# Patient Record
Sex: FEMALE | NOT HISPANIC OR LATINO | Employment: UNEMPLOYED | URBAN - NONMETROPOLITAN AREA
[De-identification: names, ages, dates, MRNs, and addresses within clinical notes are randomized per-mention and may not be internally consistent; named-entity substitution may affect disease eponyms.]

---
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
## 2019-06-09 ENCOUNTER — HOSPITAL ENCOUNTER (EMERGENCY)
Facility: HOSPITAL | Age: 19
Discharge: HOME | End: 2019-06-10
Attending: FAMILY MEDICINE | Admitting: FAMILY MEDICINE

## 2019-06-09 VITALS
DIASTOLIC BLOOD PRESSURE: 62 MMHG | TEMPERATURE: 98 F | HEIGHT: 61 IN | WEIGHT: 248 LBS | BODY MASS INDEX: 46.82 KG/M2 | HEART RATE: 78 BPM | RESPIRATION RATE: 20 BRPM | SYSTOLIC BLOOD PRESSURE: 116 MMHG | OXYGEN SATURATION: 100 %

## 2019-06-09 DIAGNOSIS — R10.30 LOWER ABDOMINAL PAIN: Primary | ICD-10-CM

## 2019-06-09 DIAGNOSIS — Z3A.01 LESS THAN 8 WEEKS GESTATION OF PREGNANCY: ICD-10-CM

## 2019-06-09 LAB
ALBUMIN SERPL-MCNC: 3.6 G/DL (ref 3.5–5.2)
ALBUMIN/GLOB SERPL: 1.1 G/DL
ALP SERPL-CCNC: 52 U/L (ref 39–117)
ALT SERPL W P-5'-P-CCNC: 14 U/L (ref 1–33)
ANION GAP SERPL CALCULATED.3IONS-SCNC: 13 MMOL/L
AST SERPL-CCNC: 13 U/L (ref 1–32)
BACTERIA UR QL AUTO: ABNORMAL /HPF
BASOPHILS # BLD AUTO: 0.02 10*3/MM3 (ref 0–0.2)
BASOPHILS NFR BLD AUTO: 0.3 % (ref 0–1.5)
BILIRUB SERPL-MCNC: 0.4 MG/DL (ref 0.2–1.2)
BILIRUB UR QL STRIP: NEGATIVE
BUN BLD-MCNC: 8 MG/DL (ref 6–20)
BUN/CREAT SERPL: 14.3 (ref 7–25)
CALCIUM SPEC-SCNC: 8.9 MG/DL (ref 8.6–10.5)
CHLORIDE SERPL-SCNC: 101 MMOL/L (ref 98–107)
CLARITY UR: ABNORMAL
CO2 SERPL-SCNC: 21 MMOL/L (ref 22–29)
COLOR UR: YELLOW
CREAT BLD-MCNC: 0.56 MG/DL (ref 0.57–1)
DEPRECATED RDW RBC AUTO: 41.1 FL (ref 37–54)
EOSINOPHIL # BLD AUTO: 0.2 10*3/MM3 (ref 0–0.4)
EOSINOPHIL NFR BLD AUTO: 2.8 % (ref 0.3–6.2)
ERYTHROCYTE [DISTWIDTH] IN BLOOD BY AUTOMATED COUNT: 12.6 % (ref 12.3–15.4)
GFR SERPL CREATININE-BSD FRML MDRD: 137 ML/MIN/1.73
GFR SERPL CREATININE-BSD FRML MDRD: >150 ML/MIN/1.73
GLOBULIN UR ELPH-MCNC: 3.2 GM/DL
GLUCOSE BLD-MCNC: 85 MG/DL (ref 65–99)
GLUCOSE UR STRIP-MCNC: NEGATIVE MG/DL
HCG SERPL QL: POSITIVE
HCT VFR BLD AUTO: 36.8 % (ref 34–46.6)
HGB BLD-MCNC: 12.9 G/DL (ref 12–15.9)
HGB UR QL STRIP.AUTO: NEGATIVE
HYALINE CASTS UR QL AUTO: ABNORMAL /LPF
IMM GRANULOCYTES # BLD AUTO: 0.03 10*3/MM3 (ref 0–0.05)
IMM GRANULOCYTES NFR BLD AUTO: 0.4 % (ref 0–0.5)
KETONES UR QL STRIP: NEGATIVE
LEUKOCYTE ESTERASE UR QL STRIP.AUTO: ABNORMAL
LYMPHOCYTES # BLD AUTO: 3.16 10*3/MM3 (ref 0.7–3.1)
LYMPHOCYTES NFR BLD AUTO: 44.6 % (ref 19.6–45.3)
MCH RBC QN AUTO: 31.3 PG (ref 26.6–33)
MCHC RBC AUTO-ENTMCNC: 35.1 G/DL (ref 31.5–35.7)
MCV RBC AUTO: 89.3 FL (ref 79–97)
MONOCYTES # BLD AUTO: 0.79 10*3/MM3 (ref 0.1–0.9)
MONOCYTES NFR BLD AUTO: 11.2 % (ref 5–12)
NEUTROPHILS # BLD AUTO: 2.88 10*3/MM3 (ref 1.7–7)
NEUTROPHILS NFR BLD AUTO: 40.7 % (ref 42.7–76)
NITRITE UR QL STRIP: NEGATIVE
NRBC BLD AUTO-RTO: 0 /100 WBC (ref 0–0.2)
PH UR STRIP.AUTO: 6 [PH] (ref 5–9)
PLATELET # BLD AUTO: 234 10*3/MM3 (ref 140–450)
PMV BLD AUTO: 10.6 FL (ref 6–12)
POTASSIUM BLD-SCNC: 3.6 MMOL/L (ref 3.5–5.2)
PROT SERPL-MCNC: 6.8 G/DL (ref 6–8.5)
PROT UR QL STRIP: NEGATIVE
RBC # BLD AUTO: 4.12 10*6/MM3 (ref 3.77–5.28)
RBC # UR: ABNORMAL /HPF
REF LAB TEST METHOD: ABNORMAL
SODIUM BLD-SCNC: 135 MMOL/L (ref 136–145)
SP GR UR STRIP: 1.03 (ref 1–1.03)
SQUAMOUS #/AREA URNS HPF: ABNORMAL /HPF
UROBILINOGEN UR QL STRIP: ABNORMAL
WBC NRBC COR # BLD: 7.08 10*3/MM3 (ref 3.4–10.8)
WBC UR QL AUTO: ABNORMAL /HPF

## 2019-06-09 PROCEDURE — 80053 COMPREHEN METABOLIC PANEL: CPT | Performed by: FAMILY MEDICINE

## 2019-06-09 PROCEDURE — 99283 EMERGENCY DEPT VISIT LOW MDM: CPT

## 2019-06-09 PROCEDURE — 96374 THER/PROPH/DIAG INJ IV PUSH: CPT

## 2019-06-09 PROCEDURE — 25010000002 ONDANSETRON PER 1 MG: Performed by: FAMILY MEDICINE

## 2019-06-09 PROCEDURE — 81001 URINALYSIS AUTO W/SCOPE: CPT | Performed by: FAMILY MEDICINE

## 2019-06-09 PROCEDURE — 85025 COMPLETE CBC W/AUTO DIFF WBC: CPT | Performed by: FAMILY MEDICINE

## 2019-06-09 PROCEDURE — 84703 CHORIONIC GONADOTROPIN ASSAY: CPT | Performed by: FAMILY MEDICINE

## 2019-06-09 RX ORDER — ONDANSETRON 4 MG/1
4 TABLET, ORALLY DISINTEGRATING ORAL 4 TIMES DAILY PRN
Qty: 12 TABLET | Refills: 0 | Status: ON HOLD | OUTPATIENT
Start: 2019-06-09 | End: 2019-12-16

## 2019-06-09 RX ORDER — ONDANSETRON 2 MG/ML
4 INJECTION INTRAMUSCULAR; INTRAVENOUS ONCE
Status: COMPLETED | OUTPATIENT
Start: 2019-06-09 | End: 2019-06-09

## 2019-06-09 RX ORDER — SODIUM CHLORIDE 0.9 % (FLUSH) 0.9 %
10 SYRINGE (ML) INJECTION AS NEEDED
Status: DISCONTINUED | OUTPATIENT
Start: 2019-06-09 | End: 2019-06-10 | Stop reason: HOSPADM

## 2019-06-09 RX ADMIN — SODIUM CHLORIDE 1000 ML: 9 INJECTION, SOLUTION INTRAVENOUS at 21:48

## 2019-06-09 RX ADMIN — ONDANSETRON 4 MG: 2 INJECTION INTRAMUSCULAR; INTRAVENOUS at 21:50

## 2019-06-10 NOTE — ED NOTES
Patient presents to ED with complaint of lower abdominal pain for 1 week. She states this is intermittent. She states she has had nausea and vomiting, denies and diarrhea. She states no bowel movement for 3 days.      Adrienne Perez RN  06/09/19 0748

## 2019-06-10 NOTE — DISCHARGE INSTRUCTIONS
Be sure to return to the emergency department immediately if you are worse or have any other concerns at any time.

## 2019-06-10 NOTE — ED PROVIDER NOTES
Subjective   19-year-old morbidly obese black female presents the emergency department with complaint of abdominal pain, nausea, vomiting, and several other vague complaints.  She states that she has been having these symptoms intermittently for at least a week.  However, she started vomiting today so she came to the emergency department.  She states that she is able to eat and she is able to drink but has not vomited a few times today.    She is also complaining of some intermittent swelling on the bottom of her right foot.  Another concern that she has is that she will get some light purple spots on her hands when she holds her phone too long.            Review of Systems   Constitutional: Negative for activity change, appetite change, chills, fatigue, fever and unexpected weight change.   HENT: Negative for nosebleeds, rhinorrhea, sore throat, trouble swallowing and voice change.    Eyes: Negative for photophobia, pain and visual disturbance.   Respiratory: Negative for apnea, cough, chest tightness, shortness of breath, wheezing and stridor.    Cardiovascular: Negative for chest pain, palpitations and leg swelling.   Gastrointestinal: Negative for abdominal distention, abdominal pain, blood in stool, constipation, diarrhea, nausea and vomiting.   Endocrine: Negative for cold intolerance, heat intolerance, polydipsia and polyuria.   Genitourinary: Negative for decreased urine volume, difficulty urinating, dysuria, flank pain, hematuria and urgency.   Musculoskeletal: Negative for arthralgias, myalgias, neck pain and neck stiffness.   Skin: Negative for color change, pallor and rash.   Allergic/Immunologic: Negative for immunocompromised state.   Neurological: Negative for dizziness, seizures, syncope, weakness, light-headedness and numbness.   Hematological: Negative for adenopathy.   Psychiatric/Behavioral: Negative for agitation, confusion, dysphoric mood and suicidal ideas. The patient is not nervous/anxious.         Past Medical History:   Diagnosis Date   • Acute febrile mucocutaneous lymph node syndrome (CMS/HCC)    • Allergic rhinitis    • Anxiety    • Asthma    • Attention deficit hyperactivity disorder    • Backache    • Chlamydia    • Depression    • Gonorrhea    • Obsessive compulsive disorder    • Pneumothorax     AS    • Rh negative status during pregnancy    • Upper respiratory infection     mostly viral   • Urinary tract infection        Allergies   Allergen Reactions   • Ritalin [Methylphenidate Hcl] Hives       History reviewed. No pertinent surgical history.    Family History   Problem Relation Age of Onset   • Cancer Other    • Diabetes Other    • Hypertension Other    • No Known Problems Father    • No Known Problems Mother    • No Known Problems Brother    • No Known Problems Sister    • Hypertension Maternal Grandmother    • No Known Problems Brother    • No Known Problems Sister        Social History     Socioeconomic History   • Marital status:      Spouse name: Not on file   • Number of children: Not on file   • Years of education: Not on file   • Highest education level: Not on file   Tobacco Use   • Smoking status: Never Smoker   Substance and Sexual Activity   • Alcohol use: No   • Drug use: No   • Sexual activity: Yes     Partners: Male           Objective   Physical Exam   Constitutional: She is oriented to person, place, and time. She appears well-developed and well-nourished. No distress.   HENT:   Head: Normocephalic and atraumatic.   Right Ear: External ear normal.   Left Ear: External ear normal.   Mouth/Throat: Oropharynx is clear and moist. No oropharyngeal exudate.   Eyes: Conjunctivae and EOM are normal. Pupils are equal, round, and reactive to light. Right eye exhibits no discharge. Left eye exhibits no discharge. No scleral icterus.   Neck: Neck supple. No JVD present. No tracheal deviation present. No thyromegaly present.   Cardiovascular: Normal rate, regular rhythm,  normal heart sounds and intact distal pulses. Exam reveals no friction rub.   No murmur heard.  Pulmonary/Chest: Effort normal and breath sounds normal. No stridor. No respiratory distress. She has no wheezes. She has no rales. She exhibits no tenderness.   Abdominal: Soft. Bowel sounds are normal. She exhibits no distension and no mass. There is no tenderness. There is no rebound and no guarding.   The abdomen is normal in appearance.  There is very little tenderness in the lower abdomen with deep palpation.  However, the patient tolerates this well.  There is no guarding or rebound.   Musculoskeletal: She exhibits no edema, tenderness or deformity.   Lymphadenopathy:     She has no cervical adenopathy.   Neurological: She is alert and oriented to person, place, and time. No cranial nerve deficit. She exhibits normal muscle tone. Coordination normal.   Skin: Skin is warm and dry. Capillary refill takes 2 to 3 seconds. No rash noted. She is not diaphoretic. No erythema.   Psychiatric: She has a normal mood and affect. Her behavior is normal. Judgment and thought content normal.   Nursing note and vitals reviewed.      Procedures           ED Course  ED Course as of Jun 09 2351   Sun Jun 09, 2019 2136 Patient was placed in room 11 and evaluated by me.  Physical exam was unremarkable.  Labs were obtained and she was given IV Zofran and fluids in the emergency department.  [CE]   2338 Her pregnancy test was positive and I discussed this finding with her.  Her abdominal pain was minimal and she did not appear to have pain on her abdominal exam. CBC was normal.  At this point I believe she is medically stable for discharge and will follow up with her primary care provider or her OB/GYN. She was given strict return precautions.   [CE]      ED Course User Index  [CE] Douglas Leyva, DO                Labs Reviewed   COMPREHENSIVE METABOLIC PANEL - Abnormal; Notable for the following components:       Result Value     Creatinine 0.56 (*)     Sodium 135 (*)     CO2 21.0 (*)     All other components within normal limits    Narrative:     GFR Normal >60  Chronic Kidney Disease <60  Kidney Failure <15   HCG, SERUM, QUALITATIVE - Abnormal; Notable for the following components:    HCG Qualitative Positive (*)     All other components within normal limits   URINALYSIS W/ MICROSCOPIC IF INDICATED (NO CULTURE) - Abnormal; Notable for the following components:    Appearance, UA Cloudy (*)     Leuk Esterase, UA Small (1+) (*)     All other components within normal limits   CBC WITH AUTO DIFFERENTIAL - Abnormal; Notable for the following components:    Neutrophil % 40.7 (*)     Lymphocytes, Absolute 3.16 (*)     All other components within normal limits   URINALYSIS, MICROSCOPIC ONLY - Abnormal; Notable for the following components:    WBC, UA 6-12 (*)     Bacteria, UA Trace (*)     Squamous Epithelial Cells, UA 3-5 (*)     All other components within normal limits   CBC AND DIFFERENTIAL    Narrative:     The following orders were created for panel order CBC & Differential.  Procedure                               Abnormality         Status                     ---------                               -----------         ------                     CBC Auto Differential[[id number]]        Abnormal            Final result                 Please view results for these tests on the individual orders.     No results found.    MDM      Final diagnoses:   Lower abdominal pain   Less than 8 weeks gestation of pregnancy            Douglas Leyva DO  06/09/19 [id number]

## 2019-06-24 DIAGNOSIS — O26.859 SPOTTING IN EARLY PREGNANCY: Primary | ICD-10-CM

## 2019-06-25 ENCOUNTER — LAB (OUTPATIENT)
Dept: LAB | Facility: HOSPITAL | Age: 19
End: 2019-06-25

## 2019-06-25 DIAGNOSIS — O26.859 SPOTTING IN EARLY PREGNANCY: ICD-10-CM

## 2019-06-25 LAB
CANDIDA ALBICANS: NEGATIVE
GARDNERELLA VAGINALIS: POSITIVE
HCG INTACT+B SERPL-ACNC: NORMAL MIU/ML
T VAGINALIS DNA VAG QL PROBE+SIG AMP: NEGATIVE

## 2019-06-25 PROCEDURE — 87510 GARDNER VAG DNA DIR PROBE: CPT

## 2019-06-25 PROCEDURE — 87480 CANDIDA DNA DIR PROBE: CPT

## 2019-06-25 PROCEDURE — 36415 COLL VENOUS BLD VENIPUNCTURE: CPT

## 2019-06-25 PROCEDURE — 87660 TRICHOMONAS VAGIN DIR PROBE: CPT

## 2019-06-25 PROCEDURE — 84702 CHORIONIC GONADOTROPIN TEST: CPT

## 2019-07-07 NOTE — ANESTHESIA PREPROCEDURE EVALUATION
Anesthesia Evaluation     Patient summary reviewed and Nursing notes reviewed   NPO Solid Status: > 8 hours  NPO Liquid Status: < 2 hours     Airway   Mallampati: III  TM distance: >3 FB  Neck ROM: full  no difficulty expected  Dental - normal exam     Pulmonary - normal exam   (+) asthma, recent URI resolved,   Cardiovascular - negative cardio ROS and normal exam        Neuro/Psych  (+) psychiatric history (OCD) Anxiety and Depression,    GI/Hepatic/Renal/Endo - negative ROS     Musculoskeletal (-) negative ROS    Abdominal  - normal exam   Substance History - negative use     OB/GYN    (+) Pregnant,         Other - negative ROS                                       Anesthesia Plan    ASA 2     epidural     Anesthetic plan and risks discussed with patient.       Yes

## 2019-07-11 ENCOUNTER — INITIAL PRENATAL (OUTPATIENT)
Dept: OBSTETRICS AND GYNECOLOGY | Facility: CLINIC | Age: 19
End: 2019-07-11

## 2019-07-11 ENCOUNTER — APPOINTMENT (OUTPATIENT)
Dept: LAB | Facility: HOSPITAL | Age: 19
End: 2019-07-11

## 2019-07-11 VITALS — BODY MASS INDEX: 45.35 KG/M2 | SYSTOLIC BLOOD PRESSURE: 118 MMHG | DIASTOLIC BLOOD PRESSURE: 77 MMHG | WEIGHT: 240 LBS

## 2019-07-11 DIAGNOSIS — N76.0 BV (BACTERIAL VAGINOSIS): ICD-10-CM

## 2019-07-11 DIAGNOSIS — Z34.80 PRENATAL CARE OF MULTIGRAVIDA, ANTEPARTUM: Primary | ICD-10-CM

## 2019-07-11 DIAGNOSIS — Z3A.14 14 WEEKS GESTATION OF PREGNANCY: Primary | ICD-10-CM

## 2019-07-11 DIAGNOSIS — B96.89 BV (BACTERIAL VAGINOSIS): ICD-10-CM

## 2019-07-11 DIAGNOSIS — O09.292 HISTORY OF POSTPARTUM HEMORRHAGE, CURRENTLY PREGNANT IN SECOND TRIMESTER: ICD-10-CM

## 2019-07-11 DIAGNOSIS — Z36.89 ENCOUNTER FOR FETAL ANATOMIC SURVEY: ICD-10-CM

## 2019-07-11 LAB
ABO GROUP BLD: NORMAL
AMPHET+METHAMPHET UR QL: NEGATIVE
BARBITURATES UR QL SCN: NEGATIVE
BASOPHILS # BLD AUTO: 0.01 10*3/MM3 (ref 0–0.2)
BASOPHILS NFR BLD AUTO: 0.2 % (ref 0–1.5)
BENZODIAZ UR QL SCN: NEGATIVE
BILIRUB UR QL STRIP: NEGATIVE
BLD GP AB SCN SERPL QL: NEGATIVE
CANNABINOIDS SERPL QL: NEGATIVE
CLARITY UR: ABNORMAL
COCAINE UR QL: NEGATIVE
COLOR UR: YELLOW
DEPRECATED RDW RBC AUTO: 39.8 FL (ref 37–54)
EOSINOPHIL # BLD AUTO: 0.11 10*3/MM3 (ref 0–0.4)
EOSINOPHIL NFR BLD AUTO: 1.8 % (ref 0.3–6.2)
ERYTHROCYTE [DISTWIDTH] IN BLOOD BY AUTOMATED COUNT: 12.2 % (ref 12.3–15.4)
GLUCOSE UR STRIP-MCNC: NEGATIVE MG/DL
HBV SURFACE AG SERPL QL IA: NORMAL
HCT VFR BLD AUTO: 34.9 % (ref 34–46.6)
HCV AB SER DONR QL: NORMAL
HGB BLD-MCNC: 12.2 G/DL (ref 12–15.9)
HGB UR QL STRIP.AUTO: NEGATIVE
HIV1+2 AB SER QL: NORMAL
IMM GRANULOCYTES # BLD AUTO: 0.03 10*3/MM3 (ref 0–0.05)
IMM GRANULOCYTES NFR BLD AUTO: 0.5 % (ref 0–0.5)
KETONES UR QL STRIP: NEGATIVE
LEUKOCYTE ESTERASE UR QL STRIP.AUTO: NEGATIVE
LYMPHOCYTES # BLD AUTO: 2.06 10*3/MM3 (ref 0.7–3.1)
LYMPHOCYTES NFR BLD AUTO: 33.2 % (ref 19.6–45.3)
Lab: NORMAL
MCH RBC QN AUTO: 31.7 PG (ref 26.6–33)
MCHC RBC AUTO-ENTMCNC: 35 G/DL (ref 31.5–35.7)
MCV RBC AUTO: 90.6 FL (ref 79–97)
METHADONE UR QL SCN: NEGATIVE
MONOCYTES # BLD AUTO: 0.61 10*3/MM3 (ref 0.1–0.9)
MONOCYTES NFR BLD AUTO: 9.8 % (ref 5–12)
NEUTROPHILS # BLD AUTO: 3.38 10*3/MM3 (ref 1.7–7)
NEUTROPHILS NFR BLD AUTO: 54.5 % (ref 42.7–76)
NITRITE UR QL STRIP: NEGATIVE
NRBC BLD AUTO-RTO: 0 /100 WBC (ref 0–0.2)
OPIATES UR QL: NEGATIVE
OXYCODONE UR QL SCN: NEGATIVE
PH UR STRIP.AUTO: 7.5 [PH] (ref 5–8)
PLATELET # BLD AUTO: 202 10*3/MM3 (ref 140–450)
PMV BLD AUTO: 11.9 FL (ref 6–12)
PROT UR QL STRIP: ABNORMAL
RBC # BLD AUTO: 3.85 10*6/MM3 (ref 3.77–5.28)
RH BLD: NEGATIVE
SP GR UR STRIP: 1.03 (ref 1–1.03)
UROBILINOGEN UR QL STRIP: ABNORMAL
WBC NRBC COR # BLD: 6.2 10*3/MM3 (ref 3.4–10.8)

## 2019-07-11 PROCEDURE — 80307 DRUG TEST PRSMV CHEM ANLYZR: CPT | Performed by: NURSE PRACTITIONER

## 2019-07-11 PROCEDURE — 87086 URINE CULTURE/COLONY COUNT: CPT | Performed by: NURSE PRACTITIONER

## 2019-07-11 PROCEDURE — 80081 OBSTETRIC PANEL INC HIV TSTG: CPT | Performed by: NURSE PRACTITIONER

## 2019-07-11 PROCEDURE — 81003 URINALYSIS AUTO W/O SCOPE: CPT | Performed by: NURSE PRACTITIONER

## 2019-07-11 PROCEDURE — 86803 HEPATITIS C AB TEST: CPT | Performed by: NURSE PRACTITIONER

## 2019-07-11 PROCEDURE — 87661 TRICHOMONAS VAGINALIS AMPLIF: CPT | Performed by: NURSE PRACTITIONER

## 2019-07-11 PROCEDURE — 36415 COLL VENOUS BLD VENIPUNCTURE: CPT

## 2019-07-11 PROCEDURE — 87491 CHLMYD TRACH DNA AMP PROBE: CPT | Performed by: NURSE PRACTITIONER

## 2019-07-11 PROCEDURE — 0501F PRENATAL FLOW SHEET: CPT | Performed by: NURSE PRACTITIONER

## 2019-07-11 PROCEDURE — 87591 N.GONORRHOEAE DNA AMP PROB: CPT | Performed by: NURSE PRACTITIONER

## 2019-07-11 RX ORDER — METRONIDAZOLE 500 MG/1
500 TABLET ORAL 2 TIMES DAILY
Qty: 14 TABLET | Refills: 0 | Status: SHIPPED | OUTPATIENT
Start: 2019-07-11 | End: 2019-07-18

## 2019-07-11 NOTE — PROGRESS NOTES
CC: NOB visit, hx reviewed     Patient Active Problem List   Diagnosis   •  (spontaneous vaginal delivery)       HPI: Here for follow up prenatal care.     ROS: No complaints.  Pt denies cramping, dysuria, or vaginal bleeding.      Pt reports she has not experienced any further bleeding since ED visit.  When discussing spotting after intercourse pt reports she is not currently sexually active.      Labs:   No results found for: HGBA1C  Glucose   Date Value Ref Range Status   2019 85 65 - 99 mg/dL Final     Last Completed Pap Smear       Status Date      PAP SMEAR No completions recorded          Radiology: Reviewed preliminary dating scan report with pt    Each of the above were reviewed and integrated into prenatal care plan.    P/E:  See Vitals flow sheet, see NOB physical in episode tab    A/P: 19 y.o. #: 1, Date: 17, Sex: Female, Weight: 3200 g (7 lb 0.9 oz), GA: 37w2d, Delivery: Vaginal, Spontaneous, Apgar1: 7, Apgar5: 8, Living: Living, Birth Comments: None    #: 2, Date: None, Sex: None, Weight: None, GA: 14w2d per CRL on today's scan      1. Routine prenatal care   Encourage PNV   SAB precautions   NOB labs drawn      2.    Diagnosis Plan   1. 14 weeks gestation of pregnancy     2. BV (bacterial vaginosis)  metroNIDAZOLE (FLAGYL) 500 MG tablet  RBA Flagyl.  Educated pt on bacterial vaginosis and answered her questions.     3. Encounter for fetal anatomic survey  US Ob Detail Fetal Anatomy Single or First Gestation   4. History of postpartum hemorrhage, currently pregnant in second trimester       RTC in 1 month for LAVELL appt and anatomy scan

## 2019-07-11 NOTE — PROGRESS NOTES
I spent approximately 45 minutes with the patient acquiring the health and history intake and discussing topics related to healthy lifestyle. This is her second pregnancy. She had a vaginal delivery at 37wks. She has had some bleeding with this pregnancy and went to the ER. They did some blood work and vaginal swab. She does have bacterial vaginosis. She is still occasionally having some spotting. I told her with the bacterial vaginosis, some spotting may happen. A newob bag is given. The 1st trimester teaching was done with the patient. We discussed a healthy diet and exercise and what is recommended. I also discussed Listeriosis and Toxoplasmosis and what fish to avoid due to high mercury levels. Informed patient not to be in hot tubs, saunas, or tanning beds. We discussed that spotting may occur after intercourse which is common, but if heavy bleeding like a period occurs to call [facility] or hospital if clinic is closed.  I encouraged her to make an appointment with the dentist if she has not had a dental exam and cleaning in the last 6 months. I instructed the patient that alcohol, illicit drug use, and tobacco smoking should be avoided in pregnancy. The patient does not smoke but discussed the importance of avoiding second hand smoke. We discussed the hospital policy procedure if a patient has a positive urine drug screen at the time of admission to the hospital. She does not plan to breastfeed. We discussed the resources that is offered at the health departments. She filled out health department referral form.  I discussed lab tests will be done today. Her last pap smear was in January in [place], [place]. A release of records is signed to get the result. The Quad screen is discussed and informed patient it is offered at 15-20 weeks and is optional.  I encouraged the patient to get the TDAP vaccine in the 3rd trimester. I told the patient that health departments are giving the TDAP vaccine to family  members. All questions were answered at this time.

## 2019-07-12 LAB
BACTERIA SPEC AEROBE CULT: NORMAL
C TRACH RRNA CVX QL NAA+PROBE: POSITIVE
N GONORRHOEA RRNA SPEC QL NAA+PROBE: NEGATIVE
RPR SER QL: NORMAL
RUBV IGG SERPL IA-ACNC: POSITIVE
TRICHOMONAS VAGINALIS PCR: NEGATIVE

## 2019-07-15 RX ORDER — AZITHROMYCIN 500 MG/1
1000 TABLET, FILM COATED ORAL ONCE
Qty: 2 TABLET | Refills: 0 | Status: SHIPPED | OUTPATIENT
Start: 2019-07-15 | End: 2019-07-15

## 2019-07-16 DIAGNOSIS — Z36.89 ENCOUNTER FOR FETAL ANATOMIC SURVEY: ICD-10-CM

## 2019-08-07 ENCOUNTER — HOSPITAL ENCOUNTER (EMERGENCY)
Facility: HOSPITAL | Age: 19
Discharge: HOME | End: 2019-08-07
Attending: EMERGENCY MEDICINE | Admitting: EMERGENCY MEDICINE

## 2019-08-07 ENCOUNTER — APPOINTMENT (OUTPATIENT)
Dept: ULTRASOUND IMAGING | Facility: HOSPITAL | Age: 19
End: 2019-08-07

## 2019-08-07 VITALS
TEMPERATURE: 97.5 F | BODY MASS INDEX: 42.34 KG/M2 | HEIGHT: 64 IN | OXYGEN SATURATION: 98 % | SYSTOLIC BLOOD PRESSURE: 114 MMHG | RESPIRATION RATE: 16 BRPM | HEART RATE: 68 BPM | DIASTOLIC BLOOD PRESSURE: 75 MMHG | WEIGHT: 248 LBS

## 2019-08-07 DIAGNOSIS — N93.9 VAGINAL BLEEDING: Primary | ICD-10-CM

## 2019-08-07 DIAGNOSIS — O44.02 PLACENTA PREVIA IN SECOND TRIMESTER: ICD-10-CM

## 2019-08-07 LAB
ALBUMIN SERPL-MCNC: 3.8 G/DL (ref 3.5–5.2)
ALBUMIN/GLOB SERPL: 1.3 G/DL
ALP SERPL-CCNC: 55 U/L (ref 39–117)
ALT SERPL W P-5'-P-CCNC: 16 U/L (ref 1–33)
ANION GAP SERPL CALCULATED.3IONS-SCNC: 9 MMOL/L (ref 5–15)
AST SERPL-CCNC: 14 U/L (ref 1–32)
BASOPHILS # BLD AUTO: 0.03 10*3/MM3 (ref 0–0.2)
BASOPHILS NFR BLD AUTO: 0.4 % (ref 0–1.5)
BILIRUB SERPL-MCNC: 0.2 MG/DL (ref 0.2–1.2)
BILIRUB UR QL STRIP: NEGATIVE
BUN BLD-MCNC: 7 MG/DL (ref 6–20)
BUN/CREAT SERPL: 14 (ref 7–25)
CALCIUM SPEC-SCNC: 9.6 MG/DL (ref 8.6–10.5)
CANDIDA ALBICANS: NEGATIVE
CHLORIDE SERPL-SCNC: 105 MMOL/L (ref 98–107)
CLARITY UR: CLEAR
CO2 SERPL-SCNC: 22 MMOL/L (ref 22–29)
COLOR UR: YELLOW
CREAT BLD-MCNC: 0.5 MG/DL (ref 0.57–1)
DEPRECATED RDW RBC AUTO: 40 FL (ref 37–54)
EOSINOPHIL # BLD AUTO: 0.17 10*3/MM3 (ref 0–0.4)
EOSINOPHIL NFR BLD AUTO: 2 % (ref 0.3–6.2)
ERYTHROCYTE [DISTWIDTH] IN BLOOD BY AUTOMATED COUNT: 12.4 % (ref 12.3–15.4)
GARDNERELLA VAGINALIS: NEGATIVE
GFR SERPL CREATININE-BSD FRML MDRD: >150 ML/MIN/1.73
GFR SERPL CREATININE-BSD FRML MDRD: >150 ML/MIN/1.73
GLOBULIN UR ELPH-MCNC: 3 GM/DL
GLUCOSE BLD-MCNC: 86 MG/DL (ref 65–99)
GLUCOSE UR STRIP-MCNC: NEGATIVE MG/DL
HCG INTACT+B SERPL-ACNC: NORMAL MIU/ML
HCT VFR BLD AUTO: 31.8 % (ref 34–46.6)
HGB BLD-MCNC: 11.5 G/DL (ref 12–15.9)
HGB UR QL STRIP.AUTO: NEGATIVE
IMM GRANULOCYTES # BLD AUTO: 0.1 10*3/MM3 (ref 0–0.05)
IMM GRANULOCYTES NFR BLD AUTO: 1.2 % (ref 0–0.5)
KETONES UR QL STRIP: NEGATIVE
LEUKOCYTE ESTERASE UR QL STRIP.AUTO: NEGATIVE
LIPASE SERPL-CCNC: 27 U/L (ref 13–60)
LYMPHOCYTES # BLD AUTO: 2.78 10*3/MM3 (ref 0.7–3.1)
LYMPHOCYTES NFR BLD AUTO: 32.6 % (ref 19.6–45.3)
MCH RBC QN AUTO: 32.5 PG (ref 26.6–33)
MCHC RBC AUTO-ENTMCNC: 36.2 G/DL (ref 31.5–35.7)
MCV RBC AUTO: 89.8 FL (ref 79–97)
MONOCYTES # BLD AUTO: 0.77 10*3/MM3 (ref 0.1–0.9)
MONOCYTES NFR BLD AUTO: 9 % (ref 5–12)
NEUTROPHILS # BLD AUTO: 4.68 10*3/MM3 (ref 1.7–7)
NEUTROPHILS NFR BLD AUTO: 54.8 % (ref 42.7–76)
NITRITE UR QL STRIP: NEGATIVE
NRBC BLD AUTO-RTO: 0 /100 WBC (ref 0–0.2)
PH UR STRIP.AUTO: 6 [PH] (ref 5–9)
PLATELET # BLD AUTO: 221 10*3/MM3 (ref 140–450)
PMV BLD AUTO: 10.4 FL (ref 6–12)
POTASSIUM BLD-SCNC: 3.8 MMOL/L (ref 3.5–5.2)
PROT SERPL-MCNC: 6.8 G/DL (ref 6–8.5)
PROT UR QL STRIP: NEGATIVE
RBC # BLD AUTO: 3.54 10*6/MM3 (ref 3.77–5.28)
SODIUM BLD-SCNC: 136 MMOL/L (ref 136–145)
SP GR UR STRIP: 1.03 (ref 1–1.03)
T VAGINALIS DNA VAG QL PROBE+SIG AMP: NEGATIVE
UROBILINOGEN UR QL STRIP: NORMAL
WBC NRBC COR # BLD: 8.53 10*3/MM3 (ref 3.4–10.8)

## 2019-08-07 PROCEDURE — 87660 TRICHOMONAS VAGIN DIR PROBE: CPT | Performed by: PHYSICIAN ASSISTANT

## 2019-08-07 PROCEDURE — 85025 COMPLETE CBC W/AUTO DIFF WBC: CPT | Performed by: PHYSICIAN ASSISTANT

## 2019-08-07 PROCEDURE — 76815 OB US LIMITED FETUS(S): CPT

## 2019-08-07 PROCEDURE — 81003 URINALYSIS AUTO W/O SCOPE: CPT | Performed by: PHYSICIAN ASSISTANT

## 2019-08-07 PROCEDURE — 87480 CANDIDA DNA DIR PROBE: CPT | Performed by: PHYSICIAN ASSISTANT

## 2019-08-07 PROCEDURE — 99283 EMERGENCY DEPT VISIT LOW MDM: CPT

## 2019-08-07 PROCEDURE — 83690 ASSAY OF LIPASE: CPT | Performed by: PHYSICIAN ASSISTANT

## 2019-08-07 PROCEDURE — 76817 TRANSVAGINAL US OBSTETRIC: CPT

## 2019-08-07 PROCEDURE — 87491 CHLMYD TRACH DNA AMP PROBE: CPT | Performed by: PHYSICIAN ASSISTANT

## 2019-08-07 PROCEDURE — 84702 CHORIONIC GONADOTROPIN TEST: CPT | Performed by: PHYSICIAN ASSISTANT

## 2019-08-07 PROCEDURE — 87661 TRICHOMONAS VAGINALIS AMPLIF: CPT | Performed by: PHYSICIAN ASSISTANT

## 2019-08-07 PROCEDURE — 80053 COMPREHEN METABOLIC PANEL: CPT | Performed by: PHYSICIAN ASSISTANT

## 2019-08-07 PROCEDURE — 87510 GARDNER VAG DNA DIR PROBE: CPT | Performed by: PHYSICIAN ASSISTANT

## 2019-08-07 PROCEDURE — 87086 URINE CULTURE/COLONY COUNT: CPT | Performed by: PHYSICIAN ASSISTANT

## 2019-08-07 PROCEDURE — 87591 N.GONORRHOEAE DNA AMP PROB: CPT | Performed by: PHYSICIAN ASSISTANT

## 2019-08-07 RX ORDER — SODIUM CHLORIDE 0.9 % (FLUSH) 0.9 %
10 SYRINGE (ML) INJECTION AS NEEDED
Status: DISCONTINUED | OUTPATIENT
Start: 2019-08-07 | End: 2019-08-07 | Stop reason: HOSPADM

## 2019-08-07 NOTE — ED PROVIDER NOTES
Subjective   Patient presents to emergency department for pelvic cramping, pink vaginal discharge, and decreased fetal movement x 2 days.  Had initial prenatal on 2019.  Denies recent sexual intercourse, dysuria.          History provided by:  Patient   used: No    Abdominal Cramping   Pain location:  Suprapubic, LLQ and RLQ  Pain quality: aching and cramping    Pain radiates to:  Does not radiate  Onset quality:  Sudden  Duration:  2 days  Timing:  Intermittent  Progression:  Unchanged  Chronicity:  New  Associated symptoms: vaginal bleeding    Associated symptoms: no chest pain, no chills, no diarrhea, no dysuria, no fever, no hematuria, no nausea, no shortness of breath, no sore throat, no vaginal discharge and no vomiting        Review of Systems   Constitutional: Negative for chills and fever.   HENT: Negative for sore throat and trouble swallowing.    Eyes: Negative for visual disturbance.   Respiratory: Negative for shortness of breath and wheezing.    Cardiovascular: Negative for chest pain.   Gastrointestinal: Positive for abdominal pain. Negative for diarrhea, nausea and vomiting.   Genitourinary: Positive for vaginal bleeding. Negative for dysuria, hematuria and vaginal discharge.   Musculoskeletal: Negative for back pain.   Allergic/Immunologic: Negative for immunocompromised state.   Hematological: Does not bruise/bleed easily.   Psychiatric/Behavioral: Negative for confusion.       Past Medical History:   Diagnosis Date   • Acute febrile mucocutaneous lymph node syndrome (CMS/HCC)    • Allergic rhinitis    • Anemia    • Anxiety    • Asthma    • Attention deficit hyperactivity disorder    • Backache    • Chlamydia    • Depression    • Gonorrhea    • Obsessive compulsive disorder    • Pneumothorax     AS    • Rh negative status during pregnancy    • Upper respiratory infection     mostly viral   • Urinary tract infection        Allergies   Allergen Reactions   • Ritalin  "[Methylphenidate Hcl] Hives       No past surgical history on file.    Family History   Problem Relation Age of Onset   • Cancer Other    • Diabetes Other    • Hypertension Other    • No Known Problems Father    • Anemia Mother    • Ovarian cancer Mother    • No Known Problems Brother    • No Known Problems Sister    • Hypertension Paternal Grandfather    • Hypertension Paternal Grandmother    • No Known Problems Maternal Grandmother    • No Known Problems Maternal Grandfather    • No Known Problems Brother    • No Known Problems Sister    • No Known Problems Daughter        Social History     Socioeconomic History   • Marital status:      Spouse name: Not on file   • Number of children: Not on file   • Years of education: Not on file   • Highest education level: Not on file   Tobacco Use   • Smoking status: Never Smoker   • Smokeless tobacco: Never Used   Substance and Sexual Activity   • Alcohol use: No   • Drug use: No   • Sexual activity: Yes     Partners: Male     Comment: last pap smear in [place] 1/2019 negative            Objective      /75 (BP Location: Left arm, Patient Position: Lying)   Pulse 68   Temp 97.5 °F (36.4 °C) (Oral)   Resp 16   Ht 162.6 cm (64\")   Wt 112 kg (248 lb)   LMP 03/23/2019   SpO2 98%   BMI 42.57 kg/m²     Physical Exam   Constitutional: She is oriented to person, place, and time. She appears well-developed and well-nourished. No distress.   HENT:   Head: Normocephalic and atraumatic.   Eyes: Conjunctivae are normal.   Cardiovascular: Normal rate, regular rhythm, normal heart sounds and intact distal pulses.   Pulmonary/Chest: Effort normal and breath sounds normal. No respiratory distress. She has no wheezes.   Abdominal: There is tenderness (lower abdomen/suprapubic).   Genitourinary: Pelvic exam was performed with patient supine. There is no rash, tenderness or lesion on the right labia. There is no rash, tenderness or lesion on the left labia. Cervix " exhibits no motion tenderness, no discharge and no friability. Right adnexum displays no mass and no tenderness. Left adnexum displays no mass and no tenderness. No tenderness or bleeding in the vagina. Vaginal discharge found.   Musculoskeletal: She exhibits no edema.   Neurological: She is alert and oriented to person, place, and time.   Skin: Skin is warm. Capillary refill takes less than 2 seconds.   Psychiatric: She has a normal mood and affect. Her behavior is normal. Thought content normal.   Nursing note and vitals reviewed.      Procedures           ED Course      Results for orders placed or performed during the hospital encounter of 08/07/19   Chlamydia trachomatis, Neisseria gonorrhoeae, Trichomonas vaginalis, PCR - Swab, Vagina   Result Value Ref Range    Chlamydia DNA by PCR Positive (A) Negative    Neisseria gonorrhoeae by PCR Negative Negative    Trichomonas vaginalis PCR Negative    Gardnerella vaginalis, Trichomonas vaginalis, Candida albicans, DNA - Swab, Vagina   Result Value Ref Range    CANDIDA ALBICANS Negative     GARDNERELLA VAGINALIS Negative     TRICHOMONAS VAGINALIS Negative    Urine Culture - Urine, Urine, Clean Catch   Result Value Ref Range    Urine Culture 50,000 CFU/mL Mixed Sonja Isolated    Comprehensive Metabolic Panel   Result Value Ref Range    Glucose 86 65 - 99 mg/dL    BUN 7 6 - 20 mg/dL    Creatinine 0.50 (L) 0.57 - 1.00 mg/dL    Sodium 136 136 - 145 mmol/L    Potassium 3.8 3.5 - 5.2 mmol/L    Chloride 105 98 - 107 mmol/L    CO2 22.0 22.0 - 29.0 mmol/L    Calcium 9.6 8.6 - 10.5 mg/dL    Total Protein 6.8 6.0 - 8.5 g/dL    Albumin 3.80 3.50 - 5.20 g/dL    ALT (SGPT) 16 1 - 33 U/L    AST (SGOT) 14 1 - 32 U/L    Alkaline Phosphatase 55 39 - 117 U/L    Total Bilirubin 0.2 0.2 - 1.2 mg/dL    eGFR Non African Amer >150 >60 mL/min/1.73    eGFR  African Amer >150 >60 mL/min/1.73    Globulin 3.0 gm/dL    A/G Ratio 1.3 g/dL    BUN/Creatinine Ratio 14.0 7.0 - 25.0    Anion Gap 9.0 5.0  - 15.0 mmol/L   hCG, Quantitative, Pregnancy   Result Value Ref Range    HCG Quantitative 11,804.00 mIU/mL   Lipase   Result Value Ref Range    Lipase 27 13 - 60 U/L   Urinalysis With Culture If Indicated - Urine, Clean Catch   Result Value Ref Range    Color, UA Yellow Yellow, Straw, Dark Yellow, [place]    Appearance, UA Clear Clear    pH, UA 6.0 5.0 - 9.0    Specific Gravity, UA 1.029 1.003 - 1.030    Glucose, UA Negative Negative    Ketones, UA Negative Negative    Bilirubin, UA Negative Negative    Blood, UA Negative Negative    Protein, UA Negative Negative    Leuk Esterase, UA Negative Negative    Nitrite, UA Negative Negative    Urobilinogen, UA 0.2 E.U./dL 0.2 - 1.0 E.U./dL   CBC Auto Differential   Result Value Ref Range    WBC 8.53 3.40 - 10.80 10*3/mm3    RBC 3.54 (L) 3.77 - 5.28 10*6/mm3    Hemoglobin 11.5 (L) 12.0 - 15.9 g/dL    Hematocrit 31.8 (L) 34.0 - 46.6 %    MCV 89.8 79.0 - 97.0 fL    MCH 32.5 26.6 - 33.0 pg    MCHC 36.2 (H) 31.5 - 35.7 g/dL    RDW 12.4 12.3 - 15.4 %    RDW-SD 40.0 37.0 - 54.0 fl    MPV 10.4 6.0 - 12.0 fL    Platelets 221 140 - 450 10*3/mm3    Neutrophil % 54.8 42.7 - 76.0 %    Lymphocyte % 32.6 19.6 - 45.3 %    Monocyte % 9.0 5.0 - 12.0 %    Eosinophil % 2.0 0.3 - 6.2 %    Basophil % 0.4 0.0 - 1.5 %    Immature Grans % 1.2 (H) 0.0 - 0.5 %    Neutrophils, Absolute 4.68 1.70 - 7.00 10*3/mm3    Lymphocytes, Absolute 2.78 0.70 - 3.10 10*3/mm3    Monocytes, Absolute 0.77 0.10 - 0.90 10*3/mm3    Eosinophils, Absolute 0.17 0.00 - 0.40 10*3/mm3    Basophils, Absolute 0.03 0.00 - 0.20 10*3/mm3    Immature Grans, Absolute 0.10 (H) 0.00 - 0.05 10*3/mm3    nRBC 0.0 0.0 - 0.2 /100 WBC     Us Ob Limited 1 + Fetuses    Result Date: 8/8/2019  Narrative: Ultrasound pregnancy limited HISTORY: Pelvic cramping. Vaginal bleeding x2 days. Pelvic pain.. Transabdominal ultrasound of the pelvis was performed. COMPARISON: None. FINDINGS: Maternal cervix measures 4.88 cm in length. Single fetus in cephalic  presentation. Amount of amniotic fluid within normal limits. Amniotic fluid index 13.00 cm. Posterior placenta with marginal placenta previa. No abruption. Fetal heart rate 151.22 bpm.     Impression: CONCLUSION: Single fetus in cephalic presentation. Amount of amniotic fluid within normal limits. Posterior placenta with marginal placenta previa. No abruption. Fetal heart rate 151.22 bpm. [id number] Electronically signed by:  Misael Alcazar MD  8/7/2019 7:56 PM CDT Workstation: [employer]     Ob Transvaginal    Result Date: 8/8/2019  Narrative: Ultrasound pregnancy limited HISTORY: Pelvic cramping. Vaginal bleeding x2 days. Pelvic pain.. Transabdominal ultrasound of the pelvis was performed. COMPARISON: None. FINDINGS: Maternal cervix measures 4.88 cm in length. Single fetus in cephalic presentation. Amount of amniotic fluid within normal limits. Amniotic fluid index 13.00 cm. Posterior placenta with marginal placenta previa. No abruption. Fetal heart rate 151.22 bpm.     Impression: CONCLUSION: Single fetus in cephalic presentation. Amount of amniotic fluid within normal limits. Posterior placenta with marginal placenta previa. No abruption. Fetal heart rate 151.22 bpm. [id number] Electronically signed by:  Misael Alcazar MD  8/7/2019 7:56 PM CDT Workstation: [employer]      Discussed results with patient.  Gave educational materials.  Advised close follow up with OB.  Return to emergency department for new or worsening symptoms.              MDM      Final diagnoses:   Vaginal bleeding   Placenta previa in second trimester            Brijesh Brasher PA-C  08/08/19 2001

## 2019-08-07 NOTE — ED NOTES
Called labor and delivery to come and get fetal heart tones.     Christy Hdez, RN  08/07/19 7948

## 2019-08-07 NOTE — ED TRIAGE NOTES
"Pt is 19 weeks pregnant and has been cramping for 2 days and has felt decreased fetal movement. Pt states she does have pink discharge, but is hesitant to say she is \"bleeding.\"  "

## 2019-08-08 LAB
BACTERIA SPEC AEROBE CULT: NORMAL
C TRACH RRNA CVX QL NAA+PROBE: POSITIVE
N GONORRHOEA RRNA SPEC QL NAA+PROBE: NEGATIVE
TRICHOMONAS VAGINALIS PCR: NEGATIVE

## 2019-08-09 ENCOUNTER — TELEPHONE (OUTPATIENT)
Dept: EMERGENCY DEPT | Facility: HOSPITAL | Age: 19
End: 2019-08-09

## 2019-08-09 RX ORDER — AZITHROMYCIN 250 MG/1
1000 TABLET, FILM COATED ORAL ONCE
Qty: 4 TABLET | Refills: 0 | Status: SHIPPED | OUTPATIENT
Start: 2019-08-09 | End: 2019-08-09

## 2019-08-15 ENCOUNTER — ROUTINE PRENATAL (OUTPATIENT)
Dept: OBSTETRICS AND GYNECOLOGY | Facility: CLINIC | Age: 19
End: 2019-08-15

## 2019-08-15 VITALS — BODY MASS INDEX: 41.71 KG/M2 | WEIGHT: 243 LBS | SYSTOLIC BLOOD PRESSURE: 106 MMHG | DIASTOLIC BLOOD PRESSURE: 72 MMHG

## 2019-08-15 DIAGNOSIS — O09.292 HISTORY OF POSTPARTUM HEMORRHAGE, CURRENTLY PREGNANT IN SECOND TRIMESTER: ICD-10-CM

## 2019-08-15 DIAGNOSIS — O26.892 RH NEGATIVE STATE IN ANTEPARTUM PERIOD, SECOND TRIMESTER: ICD-10-CM

## 2019-08-15 DIAGNOSIS — Z3A.19 19 WEEKS GESTATION OF PREGNANCY: Primary | ICD-10-CM

## 2019-08-15 DIAGNOSIS — A74.9 CHLAMYDIA INFECTION DURING PREGNANCY: ICD-10-CM

## 2019-08-15 DIAGNOSIS — O98.819 CHLAMYDIA INFECTION DURING PREGNANCY: ICD-10-CM

## 2019-08-15 DIAGNOSIS — Z67.91 RH NEGATIVE STATE IN ANTEPARTUM PERIOD, SECOND TRIMESTER: ICD-10-CM

## 2019-08-15 PROCEDURE — 0502F SUBSEQUENT PRENATAL CARE: CPT | Performed by: NURSE PRACTITIONER

## 2019-08-15 NOTE — PROGRESS NOTES
"CC: LAVELL visit, hx reviewed     Patient Active Problem List   Diagnosis   •  (spontaneous vaginal delivery)       HPI: 19 y.o.   GA: 19w2d INGRID: 2020, by Ultrasound  Here for follow up prenatal care.     ROS: \"vagina hurts\".  Pt denies cramping, dysuria, or vaginal bleeding.      Labs:   No results found for: HGBA1C  Glucose   Date Value Ref Range Status   2019 86 65 - 99 mg/dL Final   2019 85 65 - 99 mg/dL Final     Last Completed Pap Smear       Status Date      PAP SMEAR No completions recorded          Radiology: Reviewed preliminary anatomy scan report with pt- fetus in breech position, placenta posterior right, AFV WNL, EFW 8 oz, all anatomy seen had normal appearance, 3vc, its a BOY!     Each of the above were reviewed and integrated into prenatal care plan.    P/E:  See Vitals flow sheet    1. Routine prenatal care   Encourage PNV   PTL precautions, Vaginal bleeding precautions- spotting may occur after intercourse and with vaginal infections     2.    Diagnosis Plan   1. 19 weeks gestation of pregnancy     2. Chlamydia infection during pregnancy  Pt reports she forgot to  prescription, but will today-needs NO at next visit   3. Rh negative state in antepartum period, second trimester  Needs Rhogam @ 28 weeks   4. History of postpartum hemorrhage, currently pregnant in second trimester       RTC in 1 month for LAVELL appt or sooner   "

## 2019-08-23 DIAGNOSIS — Z3A.19 19 WEEKS GESTATION OF PREGNANCY: ICD-10-CM

## 2019-08-23 DIAGNOSIS — O99.212 OBESITY COMPLICATING PREGNANCY IN SECOND TRIMESTER: Primary | ICD-10-CM

## 2019-08-24 ENCOUNTER — HOSPITAL ENCOUNTER (OUTPATIENT)
Facility: HOSPITAL | Age: 19
Discharge: HOME | End: 2019-08-24
Attending: OBSTETRICS & GYNECOLOGY | Admitting: OBSTETRICS & GYNECOLOGY

## 2019-08-24 VITALS
HEART RATE: 77 BPM | OXYGEN SATURATION: 100 % | DIASTOLIC BLOOD PRESSURE: 61 MMHG | WEIGHT: 236 LBS | RESPIRATION RATE: 18 BRPM | SYSTOLIC BLOOD PRESSURE: 110 MMHG | TEMPERATURE: 98.3 F | HEIGHT: 65 IN | BODY MASS INDEX: 39.32 KG/M2

## 2019-08-24 LAB
BILIRUB UR QL STRIP: NEGATIVE
CANDIDA ALBICANS: NEGATIVE
CLARITY UR: ABNORMAL
COLOR UR: YELLOW
GARDNERELLA VAGINALIS: NEGATIVE
GLUCOSE UR STRIP-MCNC: NEGATIVE MG/DL
HGB UR QL STRIP.AUTO: NEGATIVE
KETONES UR QL STRIP: NEGATIVE
LEUKOCYTE ESTERASE UR QL STRIP.AUTO: NEGATIVE
NITRITE UR QL STRIP: NEGATIVE
PH UR STRIP.AUTO: 6 [PH] (ref 5–9)
PROT UR QL STRIP: NEGATIVE
SP GR UR STRIP: 1.03 (ref 1–1.03)
T VAGINALIS DNA VAG QL PROBE+SIG AMP: NEGATIVE
UROBILINOGEN UR QL STRIP: ABNORMAL

## 2019-08-24 PROCEDURE — 87510 GARDNER VAG DNA DIR PROBE: CPT | Performed by: OBSTETRICS & GYNECOLOGY

## 2019-08-24 PROCEDURE — 87480 CANDIDA DNA DIR PROBE: CPT | Performed by: OBSTETRICS & GYNECOLOGY

## 2019-08-24 PROCEDURE — 81003 URINALYSIS AUTO W/O SCOPE: CPT | Performed by: OBSTETRICS & GYNECOLOGY

## 2019-08-24 PROCEDURE — G0463 HOSPITAL OUTPT CLINIC VISIT: HCPCS

## 2019-08-24 PROCEDURE — 87660 TRICHOMONAS VAGIN DIR PROBE: CPT | Performed by: OBSTETRICS & GYNECOLOGY

## 2019-08-24 NOTE — DISCHARGE INSTRUCTIONS
Return to LD for contractions, vaginal bleeding, or water breaks.  Keep next appt as scheduled.  Call [phone] for questions or concerns

## 2019-08-26 DIAGNOSIS — Z3A.19 19 WEEKS GESTATION OF PREGNANCY: ICD-10-CM

## 2019-08-26 DIAGNOSIS — O99.212 OBESITY COMPLICATING PREGNANCY IN SECOND TRIMESTER: ICD-10-CM

## 2019-09-12 DIAGNOSIS — R89.9 ABNORMAL LABORATORY TEST: Primary | ICD-10-CM

## 2019-09-18 ENCOUNTER — APPOINTMENT (OUTPATIENT)
Dept: LAB | Facility: HOSPITAL | Age: 19
End: 2019-09-18

## 2019-09-18 ENCOUNTER — ROUTINE PRENATAL (OUTPATIENT)
Dept: OBSTETRICS AND GYNECOLOGY | Facility: CLINIC | Age: 19
End: 2019-09-18

## 2019-09-18 VITALS — DIASTOLIC BLOOD PRESSURE: 84 MMHG | BODY MASS INDEX: 40.8 KG/M2 | SYSTOLIC BLOOD PRESSURE: 122 MMHG | WEIGHT: 245.2 LBS

## 2019-09-18 DIAGNOSIS — Z3A.24 24 WEEKS GESTATION OF PREGNANCY: ICD-10-CM

## 2019-09-18 DIAGNOSIS — O09.899 HISTORY OF MATERNAL CHLAMYDIA INFECTION, CURRENTLY PREGNANT: ICD-10-CM

## 2019-09-18 DIAGNOSIS — O99.210 MATERNAL OBESITY AFFECTING PREGNANCY, ANTEPARTUM: ICD-10-CM

## 2019-09-18 DIAGNOSIS — Z13.1 SCREENING FOR DIABETES MELLITUS: ICD-10-CM

## 2019-09-18 DIAGNOSIS — Z34.80 SUPERVISION OF OTHER NORMAL PREGNANCY, ANTEPARTUM: Primary | ICD-10-CM

## 2019-09-18 DIAGNOSIS — K59.09 OTHER CONSTIPATION: ICD-10-CM

## 2019-09-18 DIAGNOSIS — O26.899 RH NEGATIVE STATE IN ANTEPARTUM PERIOD: ICD-10-CM

## 2019-09-18 DIAGNOSIS — Z67.91 RH NEGATIVE STATE IN ANTEPARTUM PERIOD: ICD-10-CM

## 2019-09-18 PROBLEM — A74.9 CHLAMYDIA INFECTION DURING PREGNANCY: Status: ACTIVE | Noted: 2019-08-07

## 2019-09-18 PROBLEM — O98.819 CHLAMYDIA INFECTION DURING PREGNANCY: Status: ACTIVE | Noted: 2019-08-07

## 2019-09-18 PROCEDURE — 87661 TRICHOMONAS VAGINALIS AMPLIF: CPT | Performed by: FAMILY MEDICINE

## 2019-09-18 PROCEDURE — 87591 N.GONORRHOEAE DNA AMP PROB: CPT | Performed by: FAMILY MEDICINE

## 2019-09-18 PROCEDURE — 0502F SUBSEQUENT PRENATAL CARE: CPT | Performed by: FAMILY MEDICINE

## 2019-09-18 PROCEDURE — 87491 CHLMYD TRACH DNA AMP PROBE: CPT | Performed by: FAMILY MEDICINE

## 2019-09-18 RX ORDER — DOCUSATE SODIUM 100 MG/1
100 CAPSULE, LIQUID FILLED ORAL 2 TIMES DAILY PRN
Qty: 60 CAPSULE | Refills: 1 | Status: SHIPPED | OUTPATIENT
Start: 2019-09-18 | End: 2019-12-04

## 2019-09-18 NOTE — PROGRESS NOTES
CC: Prenatal visit    Nickie Cruz is a 19 y.o.  at 24w1d.  Doing well. Denies contractions, LOF, or VB.  Reports good FM.  Does complain of constipation, has not had a bowel movement in 3 days.     /84   Wt 111 kg (245 lb 3.2 oz)   LMP 2019   BMI 40.80 kg/m²   Fundal Height (cm): 34 cm  Fetal Heart Rate: 153     Problems (from 19 to present)     Problem Noted Resolved    Supervision of other normal pregnancy, antepartum 2019 by Lima Gray MD No    Overview Signed 2019  4:13 PM by Lima Gray MD     O Neg/Rubella Immune/ GBS unk  Dating 2TUS 19 14w2d  Rhogam @ 28wks  Tdap @ 28wks  Glucola @ 28 wks  Formula feeding  BC undecided         Maternal obesity affecting pregnancy, antepartum 2019 by Lima Gray MD No    Rh negative state in antepartum period 2019 by Lima Gray MD No    Overview Signed 2019  4:14 PM by Lima Gray MD     Needs Rhogam @ 28wks         Chlamydia infection during pregnancy 2019 by Lima Gray MD No    Overview Signed 2019  4:10 PM by Lima Gray MD     - treated  - NO 19 pending             A/P: Nickie Cruz is a 19 y.o.  at 24w1d.  - RTC in 4 weeks with glucola testing.       Diagnosis Plan   1. Supervision of other normal pregnancy, antepartum     2. History of maternal chlamydia infection, currently pregnant  Chlamydia trachomatis, Neisseria gonorrhoeae, Trichomonas vaginalis, PCR - Urine, Urine, Clean Catch   3. Screening for diabetes mellitus  Glucose, Post 50 Gm Glucola    CBC Auto Differential   4. 24 weeks gestation of pregnancy     5. Maternal obesity affecting pregnancy, antepartum     6. Rh negative state in antepartum period       Lima Gray MD  2019  3:16 PM

## 2019-09-19 LAB
C TRACH RRNA CVX QL NAA+PROBE: POSITIVE
N GONORRHOEA RRNA SPEC QL NAA+PROBE: NEGATIVE
TRICHOMONAS VAGINALIS PCR: NEGATIVE

## 2019-09-20 PROBLEM — O09.299 HISTORY OF POSTPARTUM HEMORRHAGE, CURRENTLY PREGNANT: Status: ACTIVE | Noted: 2019-09-20

## 2019-09-23 ENCOUNTER — TELEPHONE (OUTPATIENT)
Dept: OBSTETRICS AND GYNECOLOGY | Facility: CLINIC | Age: 19
End: 2019-09-23

## 2019-09-23 NOTE — TELEPHONE ENCOUNTER
----- Message from Lima Gray MD sent at 9/20/2019  8:40 AM CDT -----  Needs Azithromycin 1g PO x1 sent to pharmacy.

## 2019-09-23 NOTE — TELEPHONE ENCOUNTER
I called this patient to inform her that she is still positive for chlamydia.  She is to  her antibiotic no intercourse during this course of antibiotics.  Her partner should be treated as well.  She can resume intercourse 2 weeks after both have been treated.  I informed the patient that she should have nothing in her vagina during this time.

## 2019-10-21 ENCOUNTER — APPOINTMENT (OUTPATIENT)
Dept: LAB | Facility: HOSPITAL | Age: 19
End: 2019-10-21

## 2019-10-21 ENCOUNTER — ROUTINE PRENATAL (OUTPATIENT)
Dept: OBSTETRICS AND GYNECOLOGY | Facility: CLINIC | Age: 19
End: 2019-10-21

## 2019-10-21 VITALS — DIASTOLIC BLOOD PRESSURE: 72 MMHG | SYSTOLIC BLOOD PRESSURE: 108 MMHG | WEIGHT: 245.2 LBS | BODY MASS INDEX: 40.8 KG/M2

## 2019-10-21 DIAGNOSIS — A74.9 CHLAMYDIA INFECTION DURING PREGNANCY: ICD-10-CM

## 2019-10-21 DIAGNOSIS — O09.299 HISTORY OF POSTPARTUM HEMORRHAGE, CURRENTLY PREGNANT: ICD-10-CM

## 2019-10-21 DIAGNOSIS — Z34.80 SUPERVISION OF OTHER NORMAL PREGNANCY, ANTEPARTUM: Primary | ICD-10-CM

## 2019-10-21 DIAGNOSIS — Z67.91 RH NEGATIVE STATE IN ANTEPARTUM PERIOD: ICD-10-CM

## 2019-10-21 DIAGNOSIS — O26.899 RH NEGATIVE STATE IN ANTEPARTUM PERIOD: ICD-10-CM

## 2019-10-21 DIAGNOSIS — O99.210 MATERNAL OBESITY AFFECTING PREGNANCY, ANTEPARTUM: ICD-10-CM

## 2019-10-21 DIAGNOSIS — N89.8 VAGINAL DISCHARGE: ICD-10-CM

## 2019-10-21 DIAGNOSIS — Z13.1 SCREENING FOR DIABETES MELLITUS: ICD-10-CM

## 2019-10-21 DIAGNOSIS — O98.819 CHLAMYDIA INFECTION DURING PREGNANCY: ICD-10-CM

## 2019-10-21 DIAGNOSIS — Z23 NEEDS FLU SHOT: ICD-10-CM

## 2019-10-21 LAB
BLD GP AB SCN SERPL QL: NEGATIVE
CANDIDA ALBICANS: NEGATIVE
GARDNERELLA VAGINALIS: NEGATIVE
GLUCOSE 1H P 100 G GLC PO SERPL-MCNC: 138 MG/DL (ref 60–140)
T VAGINALIS DNA VAG QL PROBE+SIG AMP: NEGATIVE

## 2019-10-21 PROCEDURE — 90674 CCIIV4 VAC NO PRSV 0.5 ML IM: CPT | Performed by: FAMILY MEDICINE

## 2019-10-21 PROCEDURE — 82950 GLUCOSE TEST: CPT | Performed by: FAMILY MEDICINE

## 2019-10-21 PROCEDURE — 85025 COMPLETE CBC W/AUTO DIFF WBC: CPT | Performed by: FAMILY MEDICINE

## 2019-10-21 PROCEDURE — 0502F SUBSEQUENT PRENATAL CARE: CPT | Performed by: FAMILY MEDICINE

## 2019-10-21 PROCEDURE — 87661 TRICHOMONAS VAGINALIS AMPLIF: CPT | Performed by: FAMILY MEDICINE

## 2019-10-21 PROCEDURE — 87480 CANDIDA DNA DIR PROBE: CPT | Performed by: FAMILY MEDICINE

## 2019-10-21 PROCEDURE — 90471 IMMUNIZATION ADMIN: CPT | Performed by: FAMILY MEDICINE

## 2019-10-21 PROCEDURE — 87510 GARDNER VAG DNA DIR PROBE: CPT | Performed by: FAMILY MEDICINE

## 2019-10-21 PROCEDURE — 90472 IMMUNIZATION ADMIN EACH ADD: CPT | Performed by: FAMILY MEDICINE

## 2019-10-21 PROCEDURE — 87660 TRICHOMONAS VAGIN DIR PROBE: CPT | Performed by: FAMILY MEDICINE

## 2019-10-21 PROCEDURE — 36415 COLL VENOUS BLD VENIPUNCTURE: CPT | Performed by: FAMILY MEDICINE

## 2019-10-21 PROCEDURE — 90715 TDAP VACCINE 7 YRS/> IM: CPT | Performed by: FAMILY MEDICINE

## 2019-10-21 PROCEDURE — 87491 CHLMYD TRACH DNA AMP PROBE: CPT | Performed by: FAMILY MEDICINE

## 2019-10-21 PROCEDURE — 86850 RBC ANTIBODY SCREEN: CPT | Performed by: FAMILY MEDICINE

## 2019-10-21 PROCEDURE — 87591 N.GONORRHOEAE DNA AMP PROB: CPT | Performed by: FAMILY MEDICINE

## 2019-10-21 NOTE — PROGRESS NOTES
CC: Prenatal visit    Nickie Cruz is a 19 y.o.  at 28w6d.  Doing well.  Denies contractions, LOF, or VB.  Reports good FM.  Reports she took her antibiotic for chlamydia on 19.  She reports her partner was also treated.   Complains of increased yellow mucoid discharge.  She is also concerned that she isn't sleeping well at night.     /72   Wt 111 kg (245 lb 3.2 oz)   LMP 2019   BMI 40.80 kg/m²   Fundal Height (cm): 30 cm  Fetal Heart Rate: 150     Problems (from 19 to present)     Problem Noted Resolved    History of postpartum hemorrhage, currently pregnant 2019 by Lima Gray MD No    Supervision of other normal pregnancy, antepartum 2019 by Lima Gray MD No    Overview Addendum 10/21/2019  4:14 PM by Lima Gray MD     O Neg/Rubella Immune/ GBS unk  Dating 2TUS 19 14w2d  Rhogam 10/21/19  Tdap 10/21/19  Glucola 10/21/19  Lab Results   Component Value Date    HGB 11.5 (L) 2019   Formula feeding  BC undecided         Maternal obesity affecting pregnancy, antepartum 2019 by Lima Gray MD No    Rh negative state in antepartum period 2019 by Lima Gray MD No    Overview Signed 2019  4:14 PM by Lima Gray MD     Needs Rhogam @ 28wks         Chlamydia infection during pregnancy 2019 by Lima Gray MD No    Overview Addendum 2019  8:43 AM by Lima Gray MD     - 19 - positive for chlamydia  - NO 19 - positive for chlamydia               A/P: Nickie Cruz is a 19 y.o.  at 28w6d.  - RTC in 4 weeks  - repeat GC/Chlamydia today    Signature  Lima Gray MD  [facility] [address] [address]  Office: [phone]      This document has been electronically signed by Lima Gray MD on 2019 3:14 PM

## 2019-10-22 LAB
BASOPHILS # BLD AUTO: 0.02 10*3/MM3 (ref 0–0.2)
BASOPHILS NFR BLD AUTO: 0.4 % (ref 0–1.5)
C TRACH RRNA CVX QL NAA+PROBE: NEGATIVE
DEPRECATED RDW RBC AUTO: 44.3 FL (ref 37–54)
EOSINOPHIL # BLD AUTO: 0.1 10*3/MM3 (ref 0–0.4)
EOSINOPHIL NFR BLD AUTO: 1.8 % (ref 0.3–6.2)
ERYTHROCYTE [DISTWIDTH] IN BLOOD BY AUTOMATED COUNT: 13 % (ref 12.3–15.4)
HCT VFR BLD AUTO: 32.9 % (ref 34–46.6)
HGB BLD-MCNC: 11.3 G/DL (ref 12–15.9)
IMM GRANULOCYTES # BLD AUTO: 0.04 10*3/MM3 (ref 0–0.05)
IMM GRANULOCYTES NFR BLD AUTO: 0.7 % (ref 0–0.5)
LYMPHOCYTES # BLD AUTO: 1.68 10*3/MM3 (ref 0.7–3.1)
LYMPHOCYTES NFR BLD AUTO: 30.5 % (ref 19.6–45.3)
MCH RBC QN AUTO: 32.5 PG (ref 26.6–33)
MCHC RBC AUTO-ENTMCNC: 34.3 G/DL (ref 31.5–35.7)
MCV RBC AUTO: 94.5 FL (ref 79–97)
MONOCYTES # BLD AUTO: 0.52 10*3/MM3 (ref 0.1–0.9)
MONOCYTES NFR BLD AUTO: 9.5 % (ref 5–12)
N GONORRHOEA RRNA SPEC QL NAA+PROBE: NEGATIVE
NEUTROPHILS # BLD AUTO: 3.14 10*3/MM3 (ref 1.7–7)
NEUTROPHILS NFR BLD AUTO: 57.1 % (ref 42.7–76)
NRBC BLD AUTO-RTO: 0 /100 WBC (ref 0–0.2)
PLATELET # BLD AUTO: 182 10*3/MM3 (ref 140–450)
PMV BLD AUTO: 12.2 FL (ref 6–12)
RBC # BLD AUTO: 3.48 10*6/MM3 (ref 3.77–5.28)
TRICHOMONAS VAGINALIS PCR: NEGATIVE
WBC NRBC COR # BLD: 5.5 10*3/MM3 (ref 3.4–10.8)

## 2019-10-23 ENCOUNTER — TELEPHONE (OUTPATIENT)
Dept: OBSTETRICS AND GYNECOLOGY | Facility: CLINIC | Age: 19
End: 2019-10-23

## 2019-11-18 ENCOUNTER — ROUTINE PRENATAL (OUTPATIENT)
Dept: OBSTETRICS AND GYNECOLOGY | Facility: CLINIC | Age: 19
End: 2019-11-18

## 2019-11-18 VITALS — WEIGHT: 241.8 LBS | DIASTOLIC BLOOD PRESSURE: 88 MMHG | SYSTOLIC BLOOD PRESSURE: 128 MMHG | BODY MASS INDEX: 40.24 KG/M2

## 2019-11-18 DIAGNOSIS — Z3A.32 32 WEEKS GESTATION OF PREGNANCY: Primary | ICD-10-CM

## 2019-11-18 DIAGNOSIS — Z67.91 RH NEGATIVE STATE IN ANTEPARTUM PERIOD: ICD-10-CM

## 2019-11-18 DIAGNOSIS — O99.210 MATERNAL OBESITY AFFECTING PREGNANCY, ANTEPARTUM: ICD-10-CM

## 2019-11-18 DIAGNOSIS — O26.899 RH NEGATIVE STATE IN ANTEPARTUM PERIOD: ICD-10-CM

## 2019-11-18 DIAGNOSIS — R10.2 PELVIC PRESSURE IN PREGNANCY: ICD-10-CM

## 2019-11-18 DIAGNOSIS — O09.299 HISTORY OF POSTPARTUM HEMORRHAGE, CURRENTLY PREGNANT: ICD-10-CM

## 2019-11-18 DIAGNOSIS — Z34.80 SUPERVISION OF OTHER NORMAL PREGNANCY, ANTEPARTUM: ICD-10-CM

## 2019-11-18 DIAGNOSIS — O26.899 PELVIC PRESSURE IN PREGNANCY: ICD-10-CM

## 2019-11-18 PROBLEM — O98.819 CHLAMYDIA INFECTION DURING PREGNANCY: Status: RESOLVED | Noted: 2019-08-07 | Resolved: 2019-11-18

## 2019-11-18 PROBLEM — A74.9 CHLAMYDIA INFECTION DURING PREGNANCY: Status: RESOLVED | Noted: 2019-08-07 | Resolved: 2019-11-18

## 2019-11-18 PROCEDURE — 0502F SUBSEQUENT PRENATAL CARE: CPT | Performed by: STUDENT IN AN ORGANIZED HEALTH CARE EDUCATION/TRAINING PROGRAM

## 2019-11-18 NOTE — PROGRESS NOTES
I have reviewed the notes, assessments, and/or procedures performed by Sury Charles MD, I concur with her/his documentation of Nickie Cruz.     Patient reports a lot of increased pelvic pressure during ambulation that she states is very uncomfortable.  Denies LOF, Ctx, VB.  Will try Prenatal Cradle for her for support.       This document has been electronically signed by Lima Gray MD on November 18, 2019 4:16 PM

## 2019-11-25 ENCOUNTER — HOSPITAL ENCOUNTER (OUTPATIENT)
Facility: HOSPITAL | Age: 19
Discharge: HOME | End: 2019-11-25
Attending: OBSTETRICS & GYNECOLOGY | Admitting: OBSTETRICS & GYNECOLOGY

## 2019-11-25 VITALS — DIASTOLIC BLOOD PRESSURE: 75 MMHG | TEMPERATURE: 98.8 F | HEART RATE: 104 BPM | SYSTOLIC BLOOD PRESSURE: 130 MMHG

## 2019-11-25 PROCEDURE — 59025 FETAL NON-STRESS TEST: CPT | Performed by: OBSTETRICS & GYNECOLOGY

## 2019-11-25 PROCEDURE — G0463 HOSPITAL OUTPT CLINIC VISIT: HCPCS

## 2019-11-25 PROCEDURE — 59025 FETAL NON-STRESS TEST: CPT

## 2019-11-25 NOTE — NON STRESS TEST
Nickie Cruz, a  at 33w6d with an INGRID of 2020, by Ultrasound, was seen at [facility] LABOR DELIVERY for a nonstress test.    Chief Complaint   Patient presents with   • Vaginal Discharge     thick pinkish d/c, had some cramping last night , none now . concerned about d/c and wanted it checked out.       Patient Active Problem List   Diagnosis   • Supervision of other normal pregnancy, antepartum   • Maternal obesity affecting pregnancy, antepartum   • Rh negative state in antepartum period   • History of postpartum hemorrhage, currently pregnant   • Pelvic pressure in pregnancy       Start Time: 1340  Stop Time: 1415    Interpretation A  Nonstress Test Interpretation A: Reactive (19 1424 : Jacqueline Reese, RN)  Comments A: reviewed with AMALIA Downs RNC (19 1424 : Jacqueline Reese, RN)        Chief Complaint   Patient presents with   • Vaginal Discharge     thick pinkish d/c, had some cramping last night , none now . concerned about d/c and wanted it checked out.   offered to let pt stay for 2 hr for recheck. Pt wants to go home. Lives close.

## 2019-12-04 ENCOUNTER — ROUTINE PRENATAL (OUTPATIENT)
Dept: OBSTETRICS AND GYNECOLOGY | Facility: CLINIC | Age: 19
End: 2019-12-04

## 2019-12-04 VITALS — SYSTOLIC BLOOD PRESSURE: 128 MMHG | DIASTOLIC BLOOD PRESSURE: 80 MMHG | BODY MASS INDEX: 41 KG/M2 | WEIGHT: 246.4 LBS

## 2019-12-04 DIAGNOSIS — O26.899 RH NEGATIVE STATE IN ANTEPARTUM PERIOD: ICD-10-CM

## 2019-12-04 DIAGNOSIS — O99.210 MATERNAL OBESITY AFFECTING PREGNANCY, ANTEPARTUM: ICD-10-CM

## 2019-12-04 DIAGNOSIS — Z67.91 RH NEGATIVE STATE IN ANTEPARTUM PERIOD: ICD-10-CM

## 2019-12-04 DIAGNOSIS — O09.299 HISTORY OF POSTPARTUM HEMORRHAGE, CURRENTLY PREGNANT: ICD-10-CM

## 2019-12-04 DIAGNOSIS — Z3A.35 35 WEEKS GESTATION OF PREGNANCY: Primary | ICD-10-CM

## 2019-12-04 DIAGNOSIS — Z36.85 ANTENATAL SCREENING FOR STREPTOCOCCUS B: Primary | ICD-10-CM

## 2019-12-04 DIAGNOSIS — Z34.80 SUPERVISION OF OTHER NORMAL PREGNANCY, ANTEPARTUM: ICD-10-CM

## 2019-12-04 PROCEDURE — 0502F SUBSEQUENT PRENATAL CARE: CPT | Performed by: STUDENT IN AN ORGANIZED HEALTH CARE EDUCATION/TRAINING PROGRAM

## 2019-12-04 NOTE — PROGRESS NOTES
CC: Prenatal visit    Nickie Cruz is a 19 y.o.  at 35w1d.  Doing well.  No complaints.  Denies LOF, or VB.  Reports good FM. Reports intermittent contractions that resolve with rest and are not increasing in frequency.    /80   Wt 112 kg (246 lb 6.4 oz)   LMP 2019   BMI 41.00 kg/m²   SVE: Deferred   Fundal Height (cm): 35 cm  Fetal Heart Rate: 136     Problems (from 19 to present)     Problem Noted Resolved    History of postpartum hemorrhage, currently pregnant 2019 by Lima Gray MD No    Supervision of other normal pregnancy, antepartum 2019 by Lima Gray MD No    Overview Addendum 10/22/2019  8:43 AM by Lima Gray MD     O Neg/Rubella Immune/ GBS unk  Dating 2TUS 19 14w2d  Rhogam 10/21/19  Tdap 10/21/19  Glucola 138  Lab Results   Component Value Date    HGB 11.5 (L) 2019   Formula feeding  BC undecided         Maternal obesity affecting pregnancy, antepartum 2019 by Lima Gray MD No    Rh negative state in antepartum period 2019 by Lima Gray MD No    Overview Addendum 2019  7:03 AM by Lima Gray MD     S/p Rhogam 10/21/19         Chlamydia infection during pregnancy 2019 by Lima Gray MD 2019 by Lima Gray MD    Overview Addendum 2019  7:02 AM by Lima Gray MD     - 19 - positive for chlamydia  - NO 19 - positive for chlamydia  - NO 10/21/19 - NEG               A/P: Nickie Cruz is a 19 y.o.  at 35w1d.  - RTC in 1 week for GBS  - Counseled on GBS      Diagnosis Plan   1. 35 weeks gestation of pregnancy  -will obtain GBS at next visit   2. History of postpartum hemorrhage, currently pregnant     3. Supervision of other normal pregnancy, antepartum     4. Maternal obesity affecting pregnancy, antepartum     5. Rh negative state in antepartum period  -received rhogam at 28 weeks            Hilaria Harley MD PGY3  [facility]  This document has been electronically signed by Hilaria Harley MD on December 4, 2019 4:16 PM

## 2019-12-04 NOTE — PROGRESS NOTES
I have seen the patient. I have reviewed the notes, assessments, and/or procedures performed by Hilaria Harley MD, I concur with her/his documentation of Nickie Cruz.       Signature    This document has been electronically signed by Lima Gray MD on December 4, 2019 4:21 PM

## 2019-12-11 ENCOUNTER — ROUTINE PRENATAL (OUTPATIENT)
Dept: OBSTETRICS AND GYNECOLOGY | Facility: CLINIC | Age: 19
End: 2019-12-11

## 2019-12-11 VITALS — BODY MASS INDEX: 40.74 KG/M2 | DIASTOLIC BLOOD PRESSURE: 80 MMHG | SYSTOLIC BLOOD PRESSURE: 124 MMHG | WEIGHT: 244.8 LBS

## 2019-12-11 DIAGNOSIS — O09.299 HISTORY OF POSTPARTUM HEMORRHAGE, CURRENTLY PREGNANT: ICD-10-CM

## 2019-12-11 DIAGNOSIS — Z67.91 RH NEGATIVE STATE IN ANTEPARTUM PERIOD: ICD-10-CM

## 2019-12-11 DIAGNOSIS — Z36.85 ANTENATAL SCREENING FOR STREPTOCOCCUS B: ICD-10-CM

## 2019-12-11 DIAGNOSIS — O99.210 MATERNAL OBESITY AFFECTING PREGNANCY, ANTEPARTUM: ICD-10-CM

## 2019-12-11 DIAGNOSIS — Z34.80 SUPERVISION OF OTHER NORMAL PREGNANCY, ANTEPARTUM: ICD-10-CM

## 2019-12-11 DIAGNOSIS — O26.899 RH NEGATIVE STATE IN ANTEPARTUM PERIOD: ICD-10-CM

## 2019-12-11 DIAGNOSIS — Z3A.36 36 WEEKS GESTATION OF PREGNANCY: Primary | ICD-10-CM

## 2019-12-11 PROCEDURE — 87653 STREP B DNA AMP PROBE: CPT | Performed by: FAMILY MEDICINE

## 2019-12-11 PROCEDURE — 0502F SUBSEQUENT PRENATAL CARE: CPT | Performed by: FAMILY MEDICINE

## 2019-12-11 NOTE — PROGRESS NOTES
CC: Prenatal visit    Nickie Cruz is a 19 y.o.  at 36w1d.  Doing well.  No complaints.  Denies contractions, LOF, or VB.  Reports good FM.    /80   Wt 111 kg (244 lb 12.8 oz)   LMP 2019   BMI 40.74 kg/m²   SVE: deferred  Fundal Height (cm): 36 cm  Fetal Heart Rate: 142     Problems (from 19 to present)     Problem Noted Resolved    History of postpartum hemorrhage, currently pregnant 2019 by Lima Gray MD No    Supervision of other normal pregnancy, antepartum 2019 by Lima Gray MD No    Overview Addendum 2019  9:41 AM by Lima Gray MD     O Neg/Rubella Immune/ GBS collect at 36wk  Dating 2TUS 19 14w2d  Rhogam 10/21/19  Tdap 10/21/19  Glucola 138  Lab Results   Component Value Date    HGB 11.5 (L) 2019   Formula feeding  BC undecided         Maternal obesity affecting pregnancy, antepartum 2019 by Lima Gray MD No    Rh negative state in antepartum period 2019 by Lima Gray MD No    Overview Addendum 2019  7:03 AM by Lima Gray MD     S/p Rhogam 10/21/19         Chlamydia infection during pregnancy 2019 by Lima Gray MD 2019 by Lima Gray MD    Overview Addendum 2019  7:02 AM by Lima Gray MD     - 19 - positive for chlamydia  - NO 19 - positive for chlamydia  - NO 10/21/19 - NEG               A/P: Nickie Cruz is a 19 y.o.  at 36w1d.  - RTC in 1 week  - GBS collected today  - FKC and labor precautions reviewed.     Diagnosis Plan   1. 36 weeks gestation of pregnancy     2. History of postpartum hemorrhage, currently pregnant     3. Supervision of other normal pregnancy, antepartum     4. Maternal obesity affecting pregnancy, antepartum     5. Rh negative state in antepartum period     6.  screening for streptococcus B  Group B Strep (Molecular) - , Vaginal/Rectum        Signature  Lima Gray MD  [facility], [place] [address]  Office: [phone]      This document has been electronically signed by Lima Gray MD on December 11, 2019 9:40 AM

## 2019-12-12 LAB — GROUP B STREP, DNA: NEGATIVE

## 2019-12-15 ENCOUNTER — HOSPITAL ENCOUNTER (OUTPATIENT)
Facility: HOSPITAL | Age: 19
Discharge: HOME | End: 2019-12-15
Attending: OBSTETRICS & GYNECOLOGY | Admitting: OBSTETRICS & GYNECOLOGY

## 2019-12-15 VITALS
TEMPERATURE: 98.7 F | RESPIRATION RATE: 18 BRPM | SYSTOLIC BLOOD PRESSURE: 132 MMHG | BODY MASS INDEX: 41.83 KG/M2 | DIASTOLIC BLOOD PRESSURE: 84 MMHG | HEIGHT: 64 IN | HEART RATE: 115 BPM | OXYGEN SATURATION: 100 % | WEIGHT: 245 LBS

## 2019-12-15 LAB
CANDIDA ALBICANS: NEGATIVE
GARDNERELLA VAGINALIS: NEGATIVE
T VAGINALIS DNA VAG QL PROBE+SIG AMP: NEGATIVE

## 2019-12-15 PROCEDURE — 59025 FETAL NON-STRESS TEST: CPT | Performed by: OBSTETRICS & GYNECOLOGY

## 2019-12-15 PROCEDURE — 59025 FETAL NON-STRESS TEST: CPT

## 2019-12-15 PROCEDURE — 87660 TRICHOMONAS VAGIN DIR PROBE: CPT | Performed by: OBSTETRICS & GYNECOLOGY

## 2019-12-15 PROCEDURE — 87480 CANDIDA DNA DIR PROBE: CPT | Performed by: OBSTETRICS & GYNECOLOGY

## 2019-12-15 PROCEDURE — 87510 GARDNER VAG DNA DIR PROBE: CPT | Performed by: OBSTETRICS & GYNECOLOGY

## 2019-12-15 PROCEDURE — G0463 HOSPITAL OUTPT CLINIC VISIT: HCPCS

## 2019-12-16 ENCOUNTER — HOSPITAL ENCOUNTER (OUTPATIENT)
Facility: HOSPITAL | Age: 19
Discharge: HOME | End: 2019-12-16
Attending: OBSTETRICS & GYNECOLOGY | Admitting: OBSTETRICS & GYNECOLOGY

## 2019-12-16 ENCOUNTER — HOSPITAL ENCOUNTER (EMERGENCY)
Facility: HOSPITAL | Age: 19
End: 2019-12-16

## 2019-12-16 VITALS
SYSTOLIC BLOOD PRESSURE: 114 MMHG | OXYGEN SATURATION: 98 % | RESPIRATION RATE: 16 BRPM | TEMPERATURE: 98.2 F | HEART RATE: 89 BPM | DIASTOLIC BLOOD PRESSURE: 66 MMHG

## 2019-12-16 LAB
BACTERIA UR QL AUTO: ABNORMAL /HPF
BILIRUB UR QL STRIP: NEGATIVE
CLARITY UR: CLEAR
COLOR UR: YELLOW
GLUCOSE UR STRIP-MCNC: NEGATIVE MG/DL
HGB UR QL STRIP.AUTO: NEGATIVE
HYALINE CASTS UR QL AUTO: ABNORMAL /LPF
KETONES UR QL STRIP: ABNORMAL
LEUKOCYTE ESTERASE UR QL STRIP.AUTO: ABNORMAL
NITRITE UR QL STRIP: NEGATIVE
PH UR STRIP.AUTO: 7.5 [PH] (ref 5–9)
PROT UR QL STRIP: ABNORMAL
RBC # UR: ABNORMAL /HPF
REF LAB TEST METHOD: ABNORMAL
SP GR UR STRIP: 1.02 (ref 1–1.03)
SQUAMOUS #/AREA URNS HPF: ABNORMAL /HPF
UROBILINOGEN UR QL STRIP: ABNORMAL
WBC UR QL AUTO: ABNORMAL /HPF

## 2019-12-16 PROCEDURE — 59025 FETAL NON-STRESS TEST: CPT

## 2019-12-16 PROCEDURE — G0463 HOSPITAL OUTPT CLINIC VISIT: HCPCS

## 2019-12-16 PROCEDURE — 81001 URINALYSIS AUTO W/SCOPE: CPT | Performed by: OBSTETRICS & GYNECOLOGY

## 2019-12-16 PROCEDURE — 59025 FETAL NON-STRESS TEST: CPT | Performed by: OBSTETRICS & GYNECOLOGY

## 2019-12-16 NOTE — NURSING NOTE
Reviewed discharge instructions with patient, allowed opportunity for questions, patient and mother concerned with discharge d/t pain, informed pt of dehydration and effects on uterus, all questions answered, pt verbalized understanding.  Patient  was discharge home undelivered.

## 2019-12-16 NOTE — NON STRESS TEST
"  Nickie Cruz, a  at 36w5d with an INGRID of 2020, by Ultrasound, was seen at [facility] for a nonstress test.    Chief Complaint   Patient presents with   • Contractions     not time by patient. patient states \"they are spaced out\"    • Vaginal Discharge     \"losing mucus since last night\", yellow tinged    • Vaginal Pain     increased discomfort in the last week   • other     reports fetal movement, decreased today. reports small amount of bright red blood in discharge. denies leaking of fluid.        Patient Active Problem List   Diagnosis   • Supervision of other normal pregnancy, antepartum   • Maternal obesity affecting pregnancy, antepartum   • Rh negative state in antepartum period   • History of postpartum hemorrhage, currently pregnant   • Pelvic pressure in pregnancy       Start Time:   Stop Time:     Interpretation A  Nonstress Test Interpretation A: Reactive (12/15/19 1900 : Chin Roa, RN)  Comments A: reviewed with CUCO Hough RN  (12/15/19 1900 : Chin Roa, RN)        Vaginal Panel, negative. SVE inner os 3, outer os 4/50/-2, vertex with no change after 1.5 hours. Patient ready for discharge.   "

## 2019-12-16 NOTE — NON STRESS TEST
Nickie Cruz, a  at 36w6d with an INGRID of 2020, by Ultrasound, was seen at [facility] LABOR DELIVERY for a nonstress test.    Chief Complaint   Patient presents with   • Contractions     Pt reports UC that are stronger sinceshe left LD earlier on 12/15/19.       Patient Active Problem List   Diagnosis   • Supervision of other normal pregnancy, antepartum   • Maternal obesity affecting pregnancy, antepartum   • Rh negative state in antepartum period   • History of postpartum hemorrhage, currently pregnant   • Pelvic pressure in pregnancy       Start Time: 402  Stop Time: 448    Interpretation A  Nonstress Test Interpretation A: Reactive (19 : Yisel Garner, RN)  Comments A: reviewed with Rona CASSIDY (19 : Yisel Garner, RN)        No UC noted, abdomen soft nontender, UA complete, no cervical change.  Pt ready for dc.

## 2019-12-16 NOTE — NURSING NOTE
Dr. Zimmerman notified by phone of patient arrival and complaint. History of 37 weeks vaginal delivery. MD informed that vaginal panel was sent due to increased pelvic pressure and visible milky discharge noted by RN. Vaginal panel negative. Patient denies intercourse recently. Contractions are present however irregular. Reactive tracing with moderate variability, accelerations, no decelerations. SVE on arrival inner os 3, outer os 4/50/-2, vertex position. No fluid return noted. Repeat SVE after 1.5 hours by same RN, no change.     MD order for discharge home.

## 2019-12-17 ENCOUNTER — HOSPITAL ENCOUNTER (OUTPATIENT)
Facility: HOSPITAL | Age: 19
Discharge: HOME HEALTH | End: 2019-12-17
Attending: OBSTETRICS & GYNECOLOGY | Admitting: OBSTETRICS & GYNECOLOGY

## 2019-12-17 VITALS
DIASTOLIC BLOOD PRESSURE: 74 MMHG | WEIGHT: 245 LBS | HEIGHT: 64 IN | OXYGEN SATURATION: 98 % | TEMPERATURE: 98 F | RESPIRATION RATE: 18 BRPM | BODY MASS INDEX: 41.83 KG/M2 | HEART RATE: 99 BPM | SYSTOLIC BLOOD PRESSURE: 121 MMHG

## 2019-12-17 PROCEDURE — G0463 HOSPITAL OUTPT CLINIC VISIT: HCPCS

## 2019-12-17 PROCEDURE — 59025 FETAL NON-STRESS TEST: CPT

## 2019-12-17 NOTE — NON STRESS TEST
Nickie Cruz, a  at 37w0d with an INGRID of 2020, by Ultrasound, was seen at [facility] LABOR DELIVERY for a nonstress test.    Chief Complaint   Patient presents with   • Vaginal Bleeding       Patient Active Problem List   Diagnosis   • Supervision of other normal pregnancy, antepartum   • Maternal obesity affecting pregnancy, antepartum   • Rh negative state in antepartum period   • History of postpartum hemorrhage, currently pregnant   • Pelvic pressure in pregnancy       Start Time: 1350  Stop Time: 1410  Nst reactive    Pt in with c/o vag bleeding  37.2  SVE 4, 50,-2   No change at 2 hour check

## 2019-12-30 ENCOUNTER — TELEPHONE (OUTPATIENT)
Dept: OBSTETRICS AND GYNECOLOGY | Facility: CLINIC | Age: 19
End: 2019-12-30

## 2020-12-14 ENCOUNTER — HOSPITAL ENCOUNTER (EMERGENCY)
Facility: HOSPITAL | Age: 20
Discharge: LEFT BEFORE DISCHARGE | End: 2020-12-14
Attending: EMERGENCY MEDICINE | Admitting: EMERGENCY MEDICINE

## 2020-12-14 ENCOUNTER — APPOINTMENT (OUTPATIENT)
Dept: GENERAL RADIOLOGY | Facility: HOSPITAL | Age: 20
End: 2020-12-14

## 2020-12-14 ENCOUNTER — APPOINTMENT (OUTPATIENT)
Dept: CT IMAGING | Facility: HOSPITAL | Age: 20
End: 2020-12-14

## 2020-12-14 VITALS
OXYGEN SATURATION: 99 % | WEIGHT: 224 LBS | RESPIRATION RATE: 20 BRPM | DIASTOLIC BLOOD PRESSURE: 79 MMHG | HEART RATE: 100 BPM | TEMPERATURE: 98.2 F | HEIGHT: 64 IN | BODY MASS INDEX: 38.24 KG/M2 | SYSTOLIC BLOOD PRESSURE: 155 MMHG

## 2020-12-14 DIAGNOSIS — R10.32 LEFT LOWER QUADRANT ABDOMINAL PAIN: ICD-10-CM

## 2020-12-14 DIAGNOSIS — Z20.822 SUSPECTED COVID-19 VIRUS INFECTION: Primary | ICD-10-CM

## 2020-12-14 LAB
ALBUMIN SERPL-MCNC: 4.2 G/DL (ref 3.5–5.2)
ALBUMIN/GLOB SERPL: 1.3 G/DL
ALP SERPL-CCNC: 81 U/L (ref 39–117)
ALT SERPL W P-5'-P-CCNC: 26 U/L (ref 1–33)
ANION GAP SERPL CALCULATED.3IONS-SCNC: 12 MMOL/L (ref 5–15)
AST SERPL-CCNC: 19 U/L (ref 1–32)
BASOPHILS # BLD AUTO: 0.02 10*3/MM3 (ref 0–0.2)
BASOPHILS NFR BLD AUTO: 0.2 % (ref 0–1.5)
BILIRUB SERPL-MCNC: 0.7 MG/DL (ref 0–1.2)
BUN SERPL-MCNC: 9 MG/DL (ref 6–20)
BUN/CREAT SERPL: 10.6 (ref 7–25)
CALCIUM SPEC-SCNC: 9.2 MG/DL (ref 8.6–10.5)
CHLORIDE SERPL-SCNC: 102 MMOL/L (ref 98–107)
CO2 SERPL-SCNC: 22 MMOL/L (ref 22–29)
CREAT SERPL-MCNC: 0.85 MG/DL (ref 0.57–1)
DEPRECATED RDW RBC AUTO: 41.4 FL (ref 37–54)
EOSINOPHIL # BLD AUTO: 0.04 10*3/MM3 (ref 0–0.4)
EOSINOPHIL NFR BLD AUTO: 0.4 % (ref 0.3–6.2)
ERYTHROCYTE [DISTWIDTH] IN BLOOD BY AUTOMATED COUNT: 12.8 % (ref 12.3–15.4)
GFR SERPL CREATININE-BSD FRML MDRD: 101 ML/MIN/1.73
GFR SERPL CREATININE-BSD FRML MDRD: 84 ML/MIN/1.73
GLOBULIN UR ELPH-MCNC: 3.3 GM/DL
GLUCOSE SERPL-MCNC: 126 MG/DL (ref 65–99)
HCG SERPL QL: NEGATIVE
HCT VFR BLD AUTO: 35.6 % (ref 34–46.6)
HGB BLD-MCNC: 12.3 G/DL (ref 12–15.9)
HOLD SPECIMEN: NORMAL
IMM GRANULOCYTES # BLD AUTO: 0.04 10*3/MM3 (ref 0–0.05)
IMM GRANULOCYTES NFR BLD AUTO: 0.4 % (ref 0–0.5)
LIPASE SERPL-CCNC: 20 U/L (ref 13–60)
LYMPHOCYTES # BLD AUTO: 1.4 10*3/MM3 (ref 0.7–3.1)
LYMPHOCYTES NFR BLD AUTO: 15.2 % (ref 19.6–45.3)
MCH RBC QN AUTO: 31.5 PG (ref 26.6–33)
MCHC RBC AUTO-ENTMCNC: 34.6 G/DL (ref 31.5–35.7)
MCV RBC AUTO: 91 FL (ref 79–97)
MONOCYTES # BLD AUTO: 0.78 10*3/MM3 (ref 0.1–0.9)
MONOCYTES NFR BLD AUTO: 8.5 % (ref 5–12)
NEUTROPHILS NFR BLD AUTO: 6.91 10*3/MM3 (ref 1.7–7)
NEUTROPHILS NFR BLD AUTO: 75.3 % (ref 42.7–76)
NRBC BLD AUTO-RTO: 0 /100 WBC (ref 0–0.2)
PLATELET # BLD AUTO: 272 10*3/MM3 (ref 140–450)
PMV BLD AUTO: 10.7 FL (ref 6–12)
POTASSIUM SERPL-SCNC: 3.6 MMOL/L (ref 3.5–5.2)
PROCALCITONIN SERPL-MCNC: 0.2 NG/ML (ref 0–0.25)
PROT SERPL-MCNC: 7.5 G/DL (ref 6–8.5)
RBC # BLD AUTO: 3.91 10*6/MM3 (ref 3.77–5.28)
SARS-COV-2 N GENE RESP QL NAA+PROBE: NOT DETECTED
SODIUM SERPL-SCNC: 136 MMOL/L (ref 136–145)
TROPONIN T SERPL-MCNC: <0.01 NG/ML (ref 0–0.03)
WBC # BLD AUTO: 9.19 10*3/MM3 (ref 3.4–10.8)
WHOLE BLOOD HOLD SPECIMEN: NORMAL
WHOLE BLOOD HOLD SPECIMEN: NORMAL

## 2020-12-14 PROCEDURE — 85025 COMPLETE CBC W/AUTO DIFF WBC: CPT | Performed by: EMERGENCY MEDICINE

## 2020-12-14 PROCEDURE — 84484 ASSAY OF TROPONIN QUANT: CPT | Performed by: NURSE PRACTITIONER

## 2020-12-14 PROCEDURE — 74022 RADEX COMPL AQT ABD SERIES: CPT

## 2020-12-14 PROCEDURE — 93005 ELECTROCARDIOGRAM TRACING: CPT | Performed by: NURSE PRACTITIONER

## 2020-12-14 PROCEDURE — 93010 ELECTROCARDIOGRAM REPORT: CPT | Performed by: INTERNAL MEDICINE

## 2020-12-14 PROCEDURE — 25010000002 KETOROLAC TROMETHAMINE PER 15 MG: Performed by: NURSE PRACTITIONER

## 2020-12-14 PROCEDURE — 96374 THER/PROPH/DIAG INJ IV PUSH: CPT

## 2020-12-14 PROCEDURE — 83690 ASSAY OF LIPASE: CPT | Performed by: EMERGENCY MEDICINE

## 2020-12-14 PROCEDURE — 84703 CHORIONIC GONADOTROPIN ASSAY: CPT | Performed by: EMERGENCY MEDICINE

## 2020-12-14 PROCEDURE — 84145 PROCALCITONIN (PCT): CPT | Performed by: NURSE PRACTITIONER

## 2020-12-14 PROCEDURE — 99283 EMERGENCY DEPT VISIT LOW MDM: CPT

## 2020-12-14 PROCEDURE — 80053 COMPREHEN METABOLIC PANEL: CPT | Performed by: EMERGENCY MEDICINE

## 2020-12-14 PROCEDURE — 25010000002 ONDANSETRON PER 1 MG: Performed by: NURSE PRACTITIONER

## 2020-12-14 PROCEDURE — 87635 SARS-COV-2 COVID-19 AMP PRB: CPT | Performed by: NURSE PRACTITIONER

## 2020-12-14 PROCEDURE — 96375 TX/PRO/DX INJ NEW DRUG ADDON: CPT

## 2020-12-14 RX ORDER — ONDANSETRON 2 MG/ML
4 INJECTION INTRAMUSCULAR; INTRAVENOUS ONCE
Status: COMPLETED | OUTPATIENT
Start: 2020-12-14 | End: 2020-12-14

## 2020-12-14 RX ORDER — KETOROLAC TROMETHAMINE 30 MG/ML
30 INJECTION, SOLUTION INTRAMUSCULAR; INTRAVENOUS ONCE
Status: COMPLETED | OUTPATIENT
Start: 2020-12-14 | End: 2020-12-14

## 2020-12-14 RX ORDER — SODIUM CHLORIDE 0.9 % (FLUSH) 0.9 %
10 SYRINGE (ML) INJECTION AS NEEDED
Status: DISCONTINUED | OUTPATIENT
Start: 2020-12-14 | End: 2020-12-14 | Stop reason: HOSPADM

## 2020-12-14 RX ADMIN — SODIUM CHLORIDE 1000 ML: 900 INJECTION, SOLUTION INTRAVENOUS at 12:05

## 2020-12-14 RX ADMIN — KETOROLAC TROMETHAMINE 30 MG: 30 INJECTION, SOLUTION INTRAMUSCULAR at 12:06

## 2020-12-14 RX ADMIN — ONDANSETRON HYDROCHLORIDE 4 MG: 2 INJECTION, SOLUTION INTRAMUSCULAR; INTRAVENOUS at 12:06

## 2020-12-14 NOTE — ED PROVIDER NOTES
Subjective   Patient presents to the ER with generalized c/o fever of 101 this AM, chills, abdominal cramping, diarrhea, body aches, and shortness of breath. 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 She has not taken any medications for her fever. Body aches are 4/10, abdominal pain is 6/10 to left lower quadrant. LMP was 2 weeks ago.           Review of Systems   Constitutional: Positive for appetite change, chills, fatigue and fever.   HENT: Negative for congestion, ear pain and sore throat.    Respiratory: Positive for shortness of breath. Negative for cough and wheezing.    Cardiovascular: Negative for chest pain and palpitations.   Gastrointestinal: Positive for abdominal pain and diarrhea. Negative for nausea and vomiting.   Genitourinary: Negative.    Musculoskeletal: Positive for myalgias.   Skin: Negative.    Neurological: Positive for weakness (generalized). Negative for dizziness and light-headedness.   Hematological: Does not bruise/bleed easily.   Psychiatric/Behavioral: Negative.        Past Medical History:   Diagnosis Date   • Acute febrile mucocutaneous lymph node syndrome (CMS/HCC)    • Allergic rhinitis    • Anemia    • Anxiety    • Asthma    • Attention deficit hyperactivity disorder    • Backache    • Chlamydia    • Depression    • Gonorrhea    • Obsessive compulsive disorder    • Pneumothorax     AS    • Rh negative status during pregnancy    • Upper respiratory infection     mostly viral   • Urinary tract infection        Allergies   Allergen Reactions   • Ritalin [Methylphenidate Hcl] Hives       History reviewed. No pertinent surgical history.    Family History   Problem Relation Age of Onset   • Cancer Other    • Diabetes Other    • Hypertension Other    • No Known Problems Father    • Anemia Mother    • Ovarian cancer Mother    • No Known Problems Brother    • No Known  "Problems Sister    • Hypertension Paternal Grandfather    • Hypertension Paternal Grandmother    • No Known Problems Maternal Grandmother    • No Known Problems Maternal Grandfather    • No Known Problems Brother    • No Known Problems Sister    • No Known Problems Daughter        Social History     Socioeconomic History   • Marital status: Single     Spouse name: Not on file   • Number of children: Not on file   • Years of education: Not on file   • Highest education level: Not on file   Tobacco Use   • Smoking status: Never Smoker   • Smokeless tobacco: Never Used   Substance and Sexual Activity   • Alcohol use: No   • Drug use: No   • Sexual activity: Yes     Partners: Male     Comment: last pap smear in [place] 1/2019 negative            Objective    /79   Pulse 100   Temp 98.2 °F (36.8 °C) (Infrared)   Resp 20   Ht 162.6 cm (64\")   Wt 102 kg (224 lb)   SpO2 99%   BMI 38.45 kg/m²     Physical Exam  Vitals signs and nursing note reviewed.   Constitutional:       General: She is not in acute distress.     Appearance: She is obese. She is not ill-appearing or toxic-appearing.   HENT:      Head: Normocephalic and atraumatic.   Cardiovascular:      Rate and Rhythm: Normal rate and regular rhythm.   Pulmonary:      Effort: Pulmonary effort is normal.      Breath sounds: Normal breath sounds. No wheezing or rhonchi.   Abdominal:      General: Abdomen is flat. Bowel sounds are normal.      Palpations: Abdomen is soft.      Tenderness: There is abdominal tenderness in the left upper quadrant and left lower quadrant. There is no guarding or rebound.   Skin:     General: Skin is warm and dry.      Capillary Refill: Capillary refill takes less than 2 seconds.   Neurological:      General: No focal deficit present.      Mental Status: She is alert and oriented to person, place, and time.   Psychiatric:         Mood and Affect: Mood normal.         Behavior: Behavior normal.         ECG 12 " Lead      Date/Time: 12/14/2020 12:57 PM  Performed by: Cherie Stephens APRN  Authorized by: Cherie Stephens APRN   Interpreted by physician  Rhythm: sinus rhythm  Rate: normal  BPM: 91  ST Segments: ST segments normal          Results for orders placed or performed during the hospital encounter of 12/14/20   Comprehensive Metabolic Panel    Specimen: Blood   Result Value Ref Range    Glucose 126 (H) 65 - 99 mg/dL    BUN 9 6 - 20 mg/dL    Creatinine 0.85 0.57 - 1.00 mg/dL    Sodium 136 136 - 145 mmol/L    Potassium 3.6 3.5 - 5.2 mmol/L    Chloride 102 98 - 107 mmol/L    CO2 22.0 22.0 - 29.0 mmol/L    Calcium 9.2 8.6 - 10.5 mg/dL    Total Protein 7.5 6.0 - 8.5 g/dL    Albumin 4.20 3.50 - 5.20 g/dL    ALT (SGPT) 26 1 - 33 U/L    AST (SGOT) 19 1 - 32 U/L    Alkaline Phosphatase 81 39 - 117 U/L    Total Bilirubin 0.7 0.0 - 1.2 mg/dL    eGFR Non African Amer 84 >60 mL/min/1.73    eGFR  African Amer 101 >60 mL/min/1.73    Globulin 3.3 gm/dL    A/G Ratio 1.3 g/dL    BUN/Creatinine Ratio 10.6 7.0 - 25.0    Anion Gap 12.0 5.0 - 15.0 mmol/L   Lipase    Specimen: Blood   Result Value Ref Range    Lipase 20 13 - 60 U/L   hCG, Serum, Qualitative    Specimen: Blood   Result Value Ref Range    HCG Qualitative Negative Negative   CBC Auto Differential    Specimen: Blood   Result Value Ref Range    WBC 9.19 3.40 - 10.80 10*3/mm3    RBC 3.91 3.77 - 5.28 10*6/mm3    Hemoglobin 12.3 12.0 - 15.9 g/dL    Hematocrit 35.6 34.0 - 46.6 %    MCV 91.0 79.0 - 97.0 fL    MCH 31.5 26.6 - 33.0 pg    MCHC 34.6 31.5 - 35.7 g/dL    RDW 12.8 12.3 - 15.4 %    RDW-SD 41.4 37.0 - 54.0 fl    MPV 10.7 6.0 - 12.0 fL    Platelets 272 140 - 450 10*3/mm3    Neutrophil % 75.3 42.7 - 76.0 %    Lymphocyte % 15.2 (L) 19.6 - 45.3 %    Monocyte % 8.5 5.0 - 12.0 %    Eosinophil % 0.4 0.3 - 6.2 %    Basophil % 0.2 0.0 - 1.5 %    Immature Grans % 0.4 0.0 - 0.5 %    Neutrophils, Absolute 6.91 1.70 - 7.00 10*3/mm3    Lymphocytes, Absolute 1.40 0.70 - 3.10  10*3/mm3    Monocytes, Absolute 0.78 0.10 - 0.90 10*3/mm3    Eosinophils, Absolute 0.04 0.00 - 0.40 10*3/mm3    Basophils, Absolute 0.02 0.00 - 0.20 10*3/mm3    Immature Grans, Absolute 0.04 0.00 - 0.05 10*3/mm3    nRBC 0.0 0.0 - 0.2 /100 WBC   Procalcitonin    Specimen: Blood   Result Value Ref Range    Procalcitonin 0.20 0.00 - 0.25 ng/mL   Troponin    Specimen: Blood   Result Value Ref Range    Troponin T <0.010 0.000 - 0.030 ng/mL   Light Blue Top   Result Value Ref Range    Extra Tube hold for add-on    Green Top (Gel)   Result Value Ref Range    Extra Tube Hold for add-ons.    Lavender Top   Result Value Ref Range    Extra Tube hold for add-on      Xr Abdomen 2+ Vw With Chest 1 Vw    Result Date: 12/14/2020  Narrative: Abdomen two view, chest CLINICAL INDICATION: Abdominal pain, shortness of breath.   COMPARISON: None   FINDINGS: Upright PA chest, Supine and upright views of the abdomen (four views, images). Nonobstructive bowel gas pattern. No evidence of free air. There is a radiopacity in the pelvis of uncertain significance possibly IUD. The bones are unremarkable. Normal size. Lung fields clear.     Impression: Radiopacity in the pelvis of uncertain significance possibly IUD. Abdomen is otherwise unremarkable. Electronically signed by:  Ronnie Figueredo MD  12/14/2020 1:10 PM CST Workstation: [id number]               ED Course  ED Course as of Dec 14 6088   Mon Dec 14, 2020   1416 Nursing at desk and states patient is wanting to leave A at this time. In to speak with patient and advised CT scan is pending for abdominal pain. Discussed risks with patient including deterioration of health including death and patient verbalized understanding and still wanting to leave. Discussed she will be notified of her COVID results and to self quarantine at home until notified she is negative.     [SH]      ED Course User Index  [SH] Cherie Stephens, JAKE                                           [facility]    Final  diagnoses:   Suspected COVID-19 virus infection   Left lower quadrant abdominal pain            Cherie Stephens, APRN  12/14/20 9295

## 2020-12-14 NOTE — ED NOTES
Pt requesting to sign out AMA at this time, JAKE Crespo made aware.     E[place] RN  12/14/20 1603

## 2020-12-15 ENCOUNTER — TELEPHONE (OUTPATIENT)
Dept: OTHER | Facility: HOSPITAL | Age: 20
End: 2020-12-15

## 2020-12-15 LAB
QT INTERVAL: 360 MS
QTC INTERVAL: 442 MS

## 2021-04-06 ENCOUNTER — OFFICE VISIT (OUTPATIENT)
Dept: URGENT CARE | Age: 21
End: 2021-04-06
Payer: COMMERCIAL

## 2021-04-06 VITALS
DIASTOLIC BLOOD PRESSURE: 88 MMHG | SYSTOLIC BLOOD PRESSURE: 122 MMHG | WEIGHT: 251.4 LBS | RESPIRATION RATE: 20 BRPM | BODY MASS INDEX: 42.92 KG/M2 | OXYGEN SATURATION: 98 % | TEMPERATURE: 98.1 F | HEART RATE: 94 BPM | HEIGHT: 64 IN

## 2021-04-06 DIAGNOSIS — Z20.2 EXPOSURE TO STD: ICD-10-CM

## 2021-04-06 DIAGNOSIS — N92.0 MENORRHAGIA WITH REGULAR CYCLE: Primary | ICD-10-CM

## 2021-04-06 DIAGNOSIS — Z20.2 EXPOSURE TO CHLAMYDIA: ICD-10-CM

## 2021-04-06 DIAGNOSIS — Z20.2 EXPOSURE TO GONORRHEA: ICD-10-CM

## 2021-04-06 LAB
BASOPHILS ABSOLUTE: 0 K/UL (ref 0–0.2)
BASOPHILS RELATIVE PERCENT: 0.6 % (ref 0–1)
CONTROL: NORMAL
EOSINOPHILS ABSOLUTE: 0.1 K/UL (ref 0–0.6)
EOSINOPHILS RELATIVE PERCENT: 1.9 % (ref 0–5)
HCT VFR BLD CALC: 35.6 % (ref 37–47)
HEMOGLOBIN: 11.8 G/DL (ref 12–16)
IMMATURE GRANULOCYTES #: 0 K/UL
LYMPHOCYTES ABSOLUTE: 2.4 K/UL (ref 1.1–4.5)
LYMPHOCYTES RELATIVE PERCENT: 45.8 % (ref 20–40)
MCH RBC QN AUTO: 30.8 PG (ref 27–31)
MCHC RBC AUTO-ENTMCNC: 33.1 G/DL (ref 33–37)
MCV RBC AUTO: 93 FL (ref 81–99)
MONOCYTES ABSOLUTE: 0.5 K/UL (ref 0–0.9)
MONOCYTES RELATIVE PERCENT: 9.9 % (ref 0–10)
NEUTROPHILS ABSOLUTE: 2.2 K/UL (ref 1.5–7.5)
NEUTROPHILS RELATIVE PERCENT: 41.6 % (ref 50–65)
PDW BLD-RTO: 14.1 % (ref 11.5–14.5)
PLATELET # BLD: 233 K/UL (ref 130–400)
PMV BLD AUTO: 10.8 FL (ref 9.4–12.3)
PREGNANCY TEST URINE, POC: NEGATIVE
RBC # BLD: 3.83 M/UL (ref 4.2–5.4)
WBC # BLD: 5.2 K/UL (ref 4.8–10.8)

## 2021-04-06 PROCEDURE — 81025 URINE PREGNANCY TEST: CPT | Performed by: NURSE PRACTITIONER

## 2021-04-06 PROCEDURE — 99203 OFFICE O/P NEW LOW 30 MIN: CPT | Performed by: NURSE PRACTITIONER

## 2021-04-06 PROCEDURE — 4004F PT TOBACCO SCREEN RCVD TLK: CPT | Performed by: NURSE PRACTITIONER

## 2021-04-06 PROCEDURE — G8427 DOCREV CUR MEDS BY ELIG CLIN: HCPCS | Performed by: NURSE PRACTITIONER

## 2021-04-06 PROCEDURE — 96372 THER/PROPH/DIAG INJ SC/IM: CPT | Performed by: NURSE PRACTITIONER

## 2021-04-06 PROCEDURE — 36415 COLL VENOUS BLD VENIPUNCTURE: CPT | Performed by: NURSE PRACTITIONER

## 2021-04-06 PROCEDURE — G8417 CALC BMI ABV UP PARAM F/U: HCPCS | Performed by: NURSE PRACTITIONER

## 2021-04-06 RX ORDER — AZITHROMYCIN 500 MG/1
1000 TABLET, FILM COATED ORAL ONCE
Qty: 2 TABLET | Refills: 0 | Status: SHIPPED | OUTPATIENT
Start: 2021-04-06 | End: 2021-04-06

## 2021-04-06 RX ORDER — CEFTRIAXONE 500 MG/1
500 INJECTION, POWDER, FOR SOLUTION INTRAMUSCULAR; INTRAVENOUS ONCE
Status: COMPLETED | OUTPATIENT
Start: 2021-04-06 | End: 2021-04-06

## 2021-04-06 RX ADMIN — CEFTRIAXONE 500 MG: 500 INJECTION, POWDER, FOR SOLUTION INTRAMUSCULAR; INTRAVENOUS at 16:53

## 2021-04-06 ASSESSMENT — ENCOUNTER SYMPTOMS
NAUSEA: 0
CONSTIPATION: 0
ABDOMINAL PAIN: 0
DIARRHEA: 0
VOMITING: 0

## 2021-04-06 NOTE — PROGRESS NOTES
PT given 500 mg Rocephin IM to right gluteal. Pt observed for 15 minutes without any reaction noted.  Pt discharged in stable condition

## 2021-04-06 NOTE — PATIENT INSTRUCTIONS
Patient Education      Patient Education     Pregnancy test was negative. You had a Rocephin antibiotic in the office     I sent Azithromycin to your pharmacy    No sexual relations for the next week    I have ordered an ultrasound for you to get- we will schedule this for you tomorrow    I am sending your urine off for testing- we will call you results    We will call you results of your blood work    I recommend an over the counter iron supplement while on your period if they are heavy    Return as needed  Gonorrhea: Care Instructions  Overview  Gonorrhea is a bacterial infection spread through sexual contact (sexually transmitted infection, or STI). It is found most often in the genital area but it can also infect other areas of the body, such as the rectum or throat. While some people who have gonorrhea develop symptoms within a few days after infection, some people have no symptoms. Symptoms of gonorrhea include abnormal bleeding, pain or burning during urination, or a thick discharge from the vagina or penis. Antibiotics can cure gonorrhea. Both sex partners need to be treated to keep from passing the infection back and forth. Follow-up care is a key part of your treatment and safety. Be sure to make and go to all appointments, and call your doctor if you are having problems. It's also a good idea to know your test results and keep a list of the medicines you take. How can you care for yourself at home? · Your doctor probably gave you a shot of antibiotics. If your doctor prescribed antibiotic pills, take them as directed. Do not stop taking them just because you feel better. You need to take the full course of antibiotics. · Do not have sexual contact with anyone while you are being treated. If your treatment was a single dose of antibiotics, wait at least 7 days after taking the dose before you have any sexual contact. Even if you use a condom, you may pass the infection back and forth.   · Wash symptoms get worse or have not improved within 1 week after starting treatment.     · You have any new symptoms, such as sores, bumps, rashes, blisters, or warts in the genital or anal area.     · You have a new skin rash. Where can you learn more? Go to https://chpepiceweb.health-partners. org and sign in to your FreeLunchedhart account. Enter M009 in the [facility] box to learn more about \"Gonorrhea: Care Instructions. \"     If you do not have an account, please click on the \"Sign Up Now\" link. Current as of: February 26, 2020               Content Version: 12.8  © 2006-2021 Sync[employer]x. Care instructions adapted under license by [facility] ([facility]). If you have questions about a medical condition or this instruction, always ask your healthcare professional. [address] any warranty or liability for your use of this information. Chlamydia: Care Instructions  Your Care Instructions  Chlamydia is a bacterial infection spread through sexual contact. It's one of the most common sexually transmitted infections (STIs). Most people who get chlamydia don't have symptoms. But they can still infect their sex partners. If chlamydia in women is not treated, it can cause pelvic inflammatory disease (PID), a severe pelvic infection. PID can make it hard for a woman to get pregnant. Antibiotics can cure chlamydia. Your sex partner or partners also need treatment to keep from spreading the infection. Tell your doctor if you might be pregnant. Certain antibiotics should not be used in pregnancy. Follow-up care is a key part of your treatment and safety. Be sure to make and go to all appointments, and call your doctor if you are having problems. It's also a good idea to know your test results and keep a list of the medicines you take. How can you care for yourself at home? · Chlamydia often is treated with a single dose of antibiotics in the doctor's office.  If your doctor prescribed antibiotics to take at home, take them as directed. Do not stop taking them just because you feel better. You need to take the full course of antibiotics. · Do not have sex with anyone while you are being treated. If your treatment is a single dose of antibiotics, wait at least 7 days after you take the dose before you have sex. Even if you use a condom, you and your partner may pass the infection back and forth. · Make sure to tell your sex partner or partners that you have chlamydia. They should get treated, even if they do not have symptoms. · Your doctor may have done tests for other STIs. · Your doctor may advise you to be tested again for chlamydia in 3 or 4 months. How can you prevent it? It's easier to prevent an STI than it is to treat one:  · Limit your sex partners. The safest sex is with one partner who has sex only with you. · Talk with your partner or partners about STIs before you have sex. Find out if they are at risk for an STI. Remember that it's possible to have an STI and not know it. · Wait to have sex with new partners until you've each been tested. · Don't have sex if you have symptoms of an infection or if you are being treated for an STI. · Use a condom (a male or female condom) every time you have sex. Condoms are the only form of birth control that also helps prevent STIs. · If you're pregnant, be extra careful. Some STIs can be passed to your baby during delivery. Vaccines are available for some STIs, such as HPV. Ask your doctor for more information. When should you call for help? Call 911 anytime you think you may need emergency care. For example, call if:    · You have sudden, severe pain in your belly or pelvis.    Call your doctor now or seek immediate medical care if:    · You have new belly or pelvic pain.     · You have a fever.     · You have new or increased burning or pain with urination, or you cannot urinate.     · You have pain, swelling, or tenderness in the scrotum. Watch closely for changes in your health, and be sure to contact your doctor if:    · You have unusual vaginal bleeding.     · You have a discharge from the vagina or penis.     · You think you may have been exposed to another STI.     · Your symptoms get worse or have not improved within 1 week after starting treatment.     · You have any new symptoms, such as sores, bumps, rashes, blisters, or warts in the genital or anal area. Where can you learn more? Go to https://CuriosidypeStrongLoopeb.healthJack On Blockpartners. org and sign in to your Akita account. Enter S653 in the ToonTime box to learn more about \"Chlamydia: Care Instructions. \"     If you do not have an account, please click on the \"Sign Up Now\" link. Current as of: February 26, 2020               Content Version: 12.8  © 2006-2021 [employer]. Care instructions adapted under license by [facility] ([facility]). If you have questions about a medical condition or this instruction, always ask your healthcare professional. [place] [address] any warranty or liability for your use of this information. Patient Education        Safer Sex: Care Instructions  Your Care Instructions  Safer sex is a way to reduce your risk of getting an infection spread through sex. It can also help prevent pregnancy. Most infections that are spread through sex, also called sexually transmitted infections or STIs, can be cured. But some can decrease your chances of getting pregnant if they are not treated early. Others, such as herpes, have no cure. And some, such as HIV, can be deadly. Several products can help you practice safer sex and reduce your chance of STIs. One of the best is a condom. There are condoms for men and for women. The female condom is a tube of soft plastic with a closed end that is placed deep into the vagina. You can use a special rubber sheet (dental dam) for protection during oral sex.  Disposable gloves can keep your hands from touching blood, semen, or other body fluids that can carry infections. Remember that birth control methods such as diaphragms, IUDs, foams, and birth control pills do not stop you from getting STIs. Follow-up care is a key part of your treatment and safety. Be sure to make and go to all appointments, and call your doctor if you are having problems. It's also a good idea to know your test results and keep a list of the medicines you take. How can you care for yourself at home? · Think about getting shots to prevent hepatitis A and hepatitis B. These two diseases can be spread through sex. You also can get hepatitis A if you eat infected food. · Use condoms or female condoms each time and every time you have sex. · Learn the right way to use a male condom:  ? Condoms come in several sizes. Make sure you use the right size. A condom that is too small can break easily. A condom that is too big can slip off during sex. Use a new condom each time you have sex. ? Be careful not to poke a hole in the condom when you open the wrapper. ? Squeeze the tip of the condom to keep out air. ? Pull down the loose skin (foreskin) from the head of an uncircumcised penis. ? While squeezing the tip of the condom, unroll it all the way down to the base of the firm penis. ? Never use petroleum jelly (such as Vaseline), grease, hand lotion, baby oil, or anything with oil in it. These products can make holes in the condom. ? After sex, hold the condom on your penis as you remove your penis from your partner. This will keep semen from spilling out of the condom. · Learn to use a female condom:  ? You can put in a female condom up to 8 hours before sex. ? Squeeze the smaller ring at the closed end and insert it deep into the vagina. The larger ring at the open end should stay outside the vagina. ? During sex, make sure the penis goes into the condom. ?  After the penis is removed, close the open end of the condom by twisting it. Remove the condom. · Do not use a female condom and male condom at the same time. · Do not have sex with anyone who has symptoms of an STI, such as sores on the genitals or mouth. The herpes virus that causes cold sores can spread to and from the penis and vagina. · Do not drink a lot of alcohol or use drugs before sex. This can cause you to let down your guard and not practice safer sex. · Having one sex partner (who does not have STIs and does not have sex with anyone else) is a sure way to avoid STIs. · Talk to your partner before you have sex. Find out if he or she has or is at risk for any STI. Keep in mind that a person may be able to spread an STI even if he or she does not have symptoms. You and your partner may want to get an HIV test. You should get tested again 6 months later. Where can you learn more? Go to https://Chinese Whispers MusicpeMightyHive.healthExtreme Realitypartners. org and sign in to your MyEnergy account. Enter S009 in the [facility] box to learn more about \"Safer Sex: Care Instructions. \"     If you do not have an account, please click on the \"Sign Up Now\" link. Current as of: February 26, 2020               Content Version: 12.8  © 2006-2021 Healthwise, Incorporated. Care instructions adapted under license by [facility] ([facility]). If you have questions about a medical condition or this instruction, always ask your healthcare professional. [place] [address] any warranty or liability for your use of this information.

## 2021-04-06 NOTE — PROGRESS NOTES
[address] [facility]   [address], [address]     Phone:  [phone]  Fax:  [phone]      Meseret Melendrez is a 19 y.o. female who presents today for her medical conditions/complaints as noted below. Meseret Melendrez is c/o of Exposure to STD and Vaginal Bleeding (patient states she is having abnormal heavy bleeding-patient states she had to use 12 tampons last night and then she bought some diapers to use)      Chief Complaint   Patient presents with    Exposure to STD    Vaginal Bleeding     patient states she is having abnormal heavy bleeding-patient states she had to use 12 tampons last night and then she bought some diapers to use       HPI:     HPI    Meseret Melendrez presents today for exposure to gonorrhea and chlamydia. She states she was exposed to this a few days ago by her boyfriend. She only has one sexual partner. She has vaginal discharge for a few days then started her period yesterday. Her period is heavier than normal. This is her normal cycle. She states using 10 tampons yesterday and now she is wearing a diaper due to heavy bleeding. She is not having pelvic or abdominal pain. She denies vaginal pain or itching. She has been afebrile.      Past Medical History:   Diagnosis Date    Asthma         Past Surgical History:   Procedure Laterality Date    PLEURAL SCARIFICATION         Social History     Tobacco Use    Smoking status: Current Every Day Smoker     Types: Cigars    Smokeless tobacco: Never Used   Substance Use Topics    Alcohol use: Yes        Current Outpatient Medications   Medication Sig Dispense Refill    azithromycin (ZITHROMAX) 500 MG tablet Take 2 tablets by mouth once for 1 dose 2 tablet 0     Current Facility-Administered Medications   Medication Dose Route Frequency Provider Last Rate Last Admin    cefTRIAXone (ROCEPHIN) injection 500 mg  500 mg Intramuscular Once SHERRELL Carrera           Allergies   Allergen Reactions    Ritalin [Methylphenidate]        History reviewed. No pertinent family history. Review of Systems   Constitutional: Negative for fatigue and fever. Gastrointestinal: Negative for abdominal pain, constipation, diarrhea, nausea and vomiting. Genitourinary: Positive for menstrual problem and vaginal discharge. Negative for difficulty urinating, dysuria, pelvic pain, urgency, vaginal bleeding and vaginal pain. Objective:     Physical Exam  Vitals signs and nursing note reviewed. Constitutional:       General: She is not in acute distress. Appearance: She is well-developed. She is obese. She is not ill-appearing, toxic-appearing or diaphoretic. HENT:      Head: Normocephalic and atraumatic. Right Ear: External ear normal.      Left Ear: External ear normal.      Nose: Nose normal.      Mouth/Throat:      Dentition: Normal dentition. Eyes:      General:         Right eye: No discharge. Left eye: No discharge. Conjunctiva/sclera: Conjunctivae normal.      Pupils: Pupils are equal, round, and reactive to light. Neck:      Musculoskeletal: Normal range of motion and neck supple. Cardiovascular:      Rate and Rhythm: Normal rate and regular rhythm. Pulses: Normal pulses. Heart sounds: Normal heart sounds. Pulmonary:      Effort: Pulmonary effort is normal. No respiratory distress. Breath sounds: Normal breath sounds. No stridor. No wheezing, rhonchi or rales. Abdominal:      General: Bowel sounds are normal. There is no distension. Palpations: Abdomen is soft. Tenderness: There is no abdominal tenderness. Musculoskeletal: Normal range of motion. Lumbar back: She exhibits normal range of motion. Right lower leg: No edema. Left lower leg: No edema. Skin:     General: Skin is warm and dry. Capillary Refill: Capillary refill takes less than 2 seconds. Neurological:      General: No focal deficit present.       Mental Status: She is alert and oriented to person, place, and time. Mental status is at baseline. Psychiatric:         Mood and Affect: Mood normal.         Behavior: Behavior normal.         /88   Pulse 94   Temp 98.1 °F (36.7 °C)   Resp 20   Ht 5' 4\" (1.626 m)   Wt 251 lb 6.4 oz (114 kg)   LMP 04/06/2021   SpO2 98%   BMI 43.15 kg/m²     Assessment:      Diagnosis Orders   1. Menorrhagia with regular cycle  POCT urine pregnancy    CBC Auto Differential    US NON OB TRANSVAGINAL   2. Exposure to STD  cefTRIAXone (ROCEPHIN) injection 500 mg    azithromycin (ZITHROMAX) 500 MG tablet   3. Exposure to chlamydia  cefTRIAXone (ROCEPHIN) injection 500 mg    azithromycin (ZITHROMAX) 500 MG tablet   4. Exposure to gonorrhea  cefTRIAXone (ROCEPHIN) injection 500 mg       Results for orders placed or performed in visit on 04/06/21   POCT urine pregnancy   Result Value Ref Range    Preg Test, Ur Negative     Control         Plan:     Urine pregnancy negative    Rocephin injection given in office without reaction    Azithromycin 1gm today    Abstain from sex x 1 week or until symptoms resolve. Transvaginal US due to abnormally heavy menstruation. She is to go to the ED with worsening bleeding. CBC today    Urine sent to [facility] lab for STD testing. Return if symptoms worsen or fail to improve. Orders Placed This Encounter   Procedures    US NON OB TRANSVAGINAL     Standing Status:   Future     Standing Expiration Date:   4/6/2022    CBC Auto Differential    POCT urine pregnancy       Orders Placed This Encounter   Medications    cefTRIAXone (ROCEPHIN) injection 500 mg     Order Specific Question:   Antimicrobial Indications     Answer:   STD infection    azithromycin (ZITHROMAX) 500 MG tablet     Sig: Take 2 tablets by mouth once for 1 dose     Dispense:  2 tablet     Refill:  0        Patient offered educational materials - see patient instructions for any instruction needed.   Discussed use, benefit, and side effects of prescribed medications. All patient questions answered. Instructed to continue current medications, diet and exercise. Patient agreed with treatment plan. Follow up as directed. Patient was advised to go to the ED if condition ever becomes emergent.        Electronically signed by SHERRELL Robb on 4/6/2021 at 4:39 PM

## 2021-04-07 ENCOUNTER — TELEPHONE (OUTPATIENT)
Dept: URGENT CARE | Age: 21
End: 2021-04-07

## 2021-04-07 DIAGNOSIS — N76.0 BACTERIAL VAGINOSIS: Primary | ICD-10-CM

## 2021-04-07 DIAGNOSIS — B96.89 BACTERIAL VAGINOSIS: Primary | ICD-10-CM

## 2021-04-07 RX ORDER — METRONIDAZOLE 500 MG/1
500 TABLET ORAL 2 TIMES DAILY
Qty: 14 TABLET | Refills: 0 | Status: SHIPPED | OUTPATIENT
Start: 2021-04-07 | End: 2021-04-14

## 2021-04-07 NOTE — TELEPHONE ENCOUNTER
NO chlamydia or gonorrhea noted on STD panel, but BV was noted.  I have added flagyl for her to take

## 2021-09-20 ENCOUNTER — HOSPITAL ENCOUNTER (EMERGENCY)
Age: 21
Discharge: HOME | End: 2021-09-20
Payer: COMMERCIAL

## 2021-09-20 ENCOUNTER — APPOINTMENT (OUTPATIENT)
Dept: GENERAL RADIOLOGY | Age: 21
End: 2021-09-20
Payer: COMMERCIAL

## 2021-09-20 VITALS
OXYGEN SATURATION: 99 % | HEIGHT: 64 IN | SYSTOLIC BLOOD PRESSURE: 127 MMHG | HEART RATE: 63 BPM | WEIGHT: 236 LBS | RESPIRATION RATE: 23 BRPM | DIASTOLIC BLOOD PRESSURE: 84 MMHG | BODY MASS INDEX: 40.29 KG/M2 | TEMPERATURE: 98.4 F

## 2021-09-20 DIAGNOSIS — F10.11 H/O ETOH ABUSE: ICD-10-CM

## 2021-09-20 DIAGNOSIS — E86.0 DEHYDRATION: ICD-10-CM

## 2021-09-20 DIAGNOSIS — N30.00 ACUTE CYSTITIS WITHOUT HEMATURIA: ICD-10-CM

## 2021-09-20 DIAGNOSIS — R60.9 PERIPHERAL EDEMA: Primary | ICD-10-CM

## 2021-09-20 LAB
ACETAMINOPHEN LEVEL: <15 UG/ML
ALBUMIN SERPL-MCNC: 4.5 G/DL (ref 3.5–5.2)
ALP BLD-CCNC: 81 U/L (ref 35–104)
ALT SERPL-CCNC: 24 U/L (ref 5–33)
AMORPHOUS: ABNORMAL /HPF
AMPHETAMINE SCREEN, URINE: NEGATIVE
ANION GAP SERPL CALCULATED.3IONS-SCNC: 10 MMOL/L (ref 7–19)
APTT: 28.2 SEC (ref 26–36.2)
AST SERPL-CCNC: 18 U/L (ref 5–32)
BACTERIA: ABNORMAL /HPF
BARBITURATE SCREEN URINE: NEGATIVE
BASOPHILS ABSOLUTE: 0 K/UL (ref 0–0.2)
BASOPHILS RELATIVE PERCENT: 0 % (ref 0–1)
BENZODIAZEPINE SCREEN, URINE: NEGATIVE
BILIRUB SERPL-MCNC: 0.5 MG/DL (ref 0.2–1.2)
BILIRUBIN URINE: NEGATIVE
BLOOD, URINE: NEGATIVE
BUN BLDV-MCNC: 9 MG/DL (ref 6–20)
CALCIUM SERPL-MCNC: 9.7 MG/DL (ref 8.6–10)
CANNABINOID SCREEN URINE: NEGATIVE
CHLORIDE BLD-SCNC: 103 MMOL/L (ref 98–111)
CLARITY: ABNORMAL
CO2: 25 MMOL/L (ref 22–29)
COCAINE METABOLITE SCREEN URINE: NEGATIVE
COLOR: YELLOW
CREAT SERPL-MCNC: 0.6 MG/DL (ref 0.5–0.9)
EOSINOPHILS ABSOLUTE: 0.1 K/UL (ref 0–0.6)
EOSINOPHILS RELATIVE PERCENT: 1.8 % (ref 0–5)
EPITHELIAL CELLS, UA: ABNORMAL /HPF
ETHANOL: <10 MG/DL (ref 0–0.08)
GFR AFRICAN AMERICAN: >59
GFR NON-AFRICAN AMERICAN: >60
GLUCOSE BLD-MCNC: 88 MG/DL (ref 74–109)
GLUCOSE URINE: NEGATIVE MG/DL
HCG QUALITATIVE: NEGATIVE
HCT VFR BLD CALC: 42.8 % (ref 37–47)
HEMOGLOBIN: 13.5 G/DL (ref 12–16)
IMMATURE GRANULOCYTES #: 0 K/UL
INR BLD: 1 (ref 0.88–1.18)
KETONES, URINE: 15 MG/DL
LEUKOCYTE ESTERASE, URINE: ABNORMAL
LYMPHOCYTES ABSOLUTE: 1.7 K/UL (ref 1.1–4.5)
LYMPHOCYTES RELATIVE PERCENT: 31.3 % (ref 20–40)
Lab: NORMAL
MCH RBC QN AUTO: 29.3 PG (ref 27–31)
MCHC RBC AUTO-ENTMCNC: 31.5 G/DL (ref 33–37)
MCV RBC AUTO: 92.8 FL (ref 81–99)
MONOCYTES ABSOLUTE: 0.6 K/UL (ref 0–0.9)
MONOCYTES RELATIVE PERCENT: 10.9 % (ref 0–10)
MUCUS: ABNORMAL /LPF
NEUTROPHILS ABSOLUTE: 3.1 K/UL (ref 1.5–7.5)
NEUTROPHILS RELATIVE PERCENT: 55.8 % (ref 50–65)
NITRITE, URINE: NEGATIVE
OPIATE SCREEN URINE: NEGATIVE
PDW BLD-RTO: 14.5 % (ref 11.5–14.5)
PH UA: 6 (ref 5–8)
PLATELET # BLD: 281 K/UL (ref 130–400)
PMV BLD AUTO: 10.5 FL (ref 9.4–12.3)
POTASSIUM REFLEX MAGNESIUM: 4.1 MMOL/L (ref 3.5–5)
PRO-BNP: 22 PG/ML (ref 0–450)
PROTEIN UA: NEGATIVE MG/DL
PROTHROMBIN TIME: 13.4 SEC (ref 12–14.6)
RBC # BLD: 4.61 M/UL (ref 4.2–5.4)
RBC UA: ABNORMAL /HPF (ref 0–2)
SALICYLATE, SERUM: <3 MG/DL (ref 3–10)
SODIUM BLD-SCNC: 138 MMOL/L (ref 136–145)
SPECIFIC GRAVITY UA: 1.02 (ref 1–1.03)
TOTAL PROTEIN: 8.4 G/DL (ref 6.6–8.7)
UROBILINOGEN, URINE: 1 E.U./DL
WBC # BLD: 5.5 K/UL (ref 4.8–10.8)
WBC UA: ABNORMAL /HPF (ref 0–5)

## 2021-09-20 PROCEDURE — 2580000003 HC RX 258: Performed by: PHYSICIAN ASSISTANT

## 2021-09-20 PROCEDURE — 82077 ASSAY SPEC XCP UR&BREATH IA: CPT

## 2021-09-20 PROCEDURE — 81001 URINALYSIS AUTO W/SCOPE: CPT

## 2021-09-20 PROCEDURE — 85610 PROTHROMBIN TIME: CPT

## 2021-09-20 PROCEDURE — 96361 HYDRATE IV INFUSION ADD-ON: CPT

## 2021-09-20 PROCEDURE — 36415 COLL VENOUS BLD VENIPUNCTURE: CPT

## 2021-09-20 PROCEDURE — 80053 COMPREHEN METABOLIC PANEL: CPT

## 2021-09-20 PROCEDURE — 99284 EMERGENCY DEPT VISIT MOD MDM: CPT

## 2021-09-20 PROCEDURE — 80307 DRUG TEST PRSMV CHEM ANLYZR: CPT

## 2021-09-20 PROCEDURE — 85025 COMPLETE CBC W/AUTO DIFF WBC: CPT

## 2021-09-20 PROCEDURE — 83880 ASSAY OF NATRIURETIC PEPTIDE: CPT

## 2021-09-20 PROCEDURE — 71045 X-RAY EXAM CHEST 1 VIEW: CPT

## 2021-09-20 PROCEDURE — 84703 CHORIONIC GONADOTROPIN ASSAY: CPT

## 2021-09-20 PROCEDURE — 80143 DRUG ASSAY ACETAMINOPHEN: CPT

## 2021-09-20 PROCEDURE — 87086 URINE CULTURE/COLONY COUNT: CPT

## 2021-09-20 PROCEDURE — 85730 THROMBOPLASTIN TIME PARTIAL: CPT

## 2021-09-20 PROCEDURE — 96374 THER/PROPH/DIAG INJ IV PUSH: CPT

## 2021-09-20 PROCEDURE — 6360000002 HC RX W HCPCS: Performed by: PHYSICIAN ASSISTANT

## 2021-09-20 PROCEDURE — 80179 DRUG ASSAY SALICYLATE: CPT

## 2021-09-20 RX ORDER — 0.9 % SODIUM CHLORIDE 0.9 %
1000 INTRAVENOUS SOLUTION INTRAVENOUS ONCE
Status: COMPLETED | OUTPATIENT
Start: 2021-09-20 | End: 2021-09-20

## 2021-09-20 RX ORDER — CEPHALEXIN 500 MG/1
500 CAPSULE ORAL 2 TIMES DAILY
Qty: 14 CAPSULE | Refills: 0 | Status: SHIPPED | OUTPATIENT
Start: 2021-09-20 | End: 2021-09-27

## 2021-09-20 RX ADMIN — SODIUM CHLORIDE 1000 ML: 9 INJECTION, SOLUTION INTRAVENOUS at 18:25

## 2021-09-20 RX ADMIN — CEFTRIAXONE 1000 MG: 1 INJECTION, POWDER, FOR SOLUTION INTRAMUSCULAR; INTRAVENOUS at 18:25

## 2021-09-20 ASSESSMENT — ENCOUNTER SYMPTOMS
PHOTOPHOBIA: 0
ABDOMINAL PAIN: 0
NAUSEA: 0
EYE PAIN: 0
ABDOMINAL DISTENTION: 0
RHINORRHEA: 0
SORE THROAT: 0
BACK PAIN: 0
APNEA: 0
COLOR CHANGE: 0
EYE DISCHARGE: 0
SHORTNESS OF BREATH: 0
COUGH: 0

## 2021-09-20 ASSESSMENT — PAIN DESCRIPTION - DESCRIPTORS: DESCRIPTORS: ACHING;HEAVINESS

## 2021-09-20 ASSESSMENT — PAIN SCALES - GENERAL: PAINLEVEL_OUTOF10: 8

## 2021-09-20 ASSESSMENT — PAIN DESCRIPTION - LOCATION: LOCATION: ANKLE;HAND;FOOT

## 2021-09-20 ASSESSMENT — PAIN DESCRIPTION - FREQUENCY: FREQUENCY: CONTINUOUS

## 2021-09-20 ASSESSMENT — PAIN DESCRIPTION - PAIN TYPE: TYPE: CHRONIC PAIN

## 2021-09-20 NOTE — ED PROVIDER NOTES
Peripheral hand bilateral and feet swelling fever Saturday   Skin: Negative for color change and rash. Neurological: Negative for dizziness, syncope, facial asymmetry and headaches. Hematological: Negative for adenopathy. Does not bruise/bleed easily. Psychiatric/Behavioral: Negative for agitation, behavioral problems and confusion. Except as noted above the remainder of the review of systems was reviewed and negative. PAST MEDICAL HISTORY     Past Medical History:   Diagnosis Date    Asthma          SURGICAL HISTORY       Past Surgical History:   Procedure Laterality Date    PLEURAL SCARIFICATION           CURRENT MEDICATIONS       Discharge Medication List as of 9/20/2021  7:32 PM          ALLERGIES     Ritalin [methylphenidate]    FAMILY HISTORY     No family history on file. SOCIAL HISTORY       Social History     Socioeconomic History    Marital status:      Spouse name: Not on file    Number of children: Not on file    Years of education: Not on file    Highest education level: Not on file   Occupational History    Not on file   Tobacco Use    Smoking status: Current Every Day Smoker     Types: Cigars    Smokeless tobacco: Never Used   Vaping Use    Vaping Use: Never used   Substance and Sexual Activity    Alcohol use: Yes    Drug use: Never    Sexual activity: Yes     Partners: Male   Other Topics Concern    Not on file   Social History Narrative    Not on file     Social Determinants of Health     Financial Resource Strain:     Difficulty of Paying Living Expenses:    Food Insecurity:     Worried About Running Out of Food in the Last Year:     [address] in the Last Year:    Transportation Needs:     Lack of Transportation (Medical):      Lack of Transportation (Non-Medical):    Physical Activity:     Days of Exercise per Week:     Minutes of Exercise per Session:    Stress:     Feeling of Stress :    Social Connections:     Frequency of Communication with Friends and Family:     Frequency of Social Gatherings with Friends and Family:     Attends Hindu Services:     Active Member of Clubs or Organizations:     Attends Club or Organization Meetings:     Marital Status:    Intimate Partner Violence:     Fear of Current or Ex-Partner:     Emotionally Abused:     Physically Abused:     Sexually Abused:        SCREENINGS    [place] Coma Scale  Eye Opening: Spontaneous  Best Verbal Response: Oriented  Best Motor Response: Obeys commands  [place] Coma Scale Score: 15      PHYSICAL EXAM    (up to 7 forlevel 4, 8 or more for level 5)     ED Triage Vitals [09/20/21 1357]   BP Temp Temp src Pulse Resp SpO2 Height Weight   130/89 -- -- 87 18 96 % 5' 4\" (1.626 m) 236 lb (107 kg)       Physical Exam  Vitals and nursing note reviewed. Constitutional:       General: She is not in acute distress. Appearance: Normal appearance. She is well-developed. She is obese. She is not diaphoretic. HENT:      Head: Normocephalic and atraumatic. Right Ear: Tympanic membrane, ear canal and external ear normal.      Left Ear: Tympanic membrane, ear canal and external ear normal.      Nose: Nose normal.      Mouth/Throat:      Mouth: Mucous membranes are moist.      Pharynx: No oropharyngeal exudate. Eyes:      General:         Right eye: No discharge. Left eye: No discharge. Pupils: Pupils are equal, round, and reactive to light. Neck:      Thyroid: No thyromegaly. Cardiovascular:      Rate and Rhythm: Normal rate and regular rhythm. Pulses: Normal pulses. Heart sounds: Normal heart sounds. No murmur heard. No friction rub. Pulmonary:      Effort: Pulmonary effort is normal. No respiratory distress. Breath sounds: Normal breath sounds. No stridor. No wheezing. Abdominal:      General: Abdomen is flat. Bowel sounds are normal. There is no distension. Palpations: Abdomen is soft. Tenderness:  There is no 09/20/21 16:52  ANTIBIOTICS AT [facility].:                      RECEIVED :  09/20/21 16:58   URINE DRUG SCREEN   HCG, SERUM, QUALITATIVE   COMPREHENSIVE METABOLIC PANEL W/ REFLEX TO MG FOR LOW K   BRAIN NATRIURETIC PEPTIDE   PROTIME-INR   APTT   ETHANOL   SALICYLATE LEVEL   ACETAMINOPHEN LEVEL       All other labs were within normal range or notreturned as of this dictation. RE-ASSESSMENT        EMERGENCY DEPARTMENT COURSE and DIFFERENTIAL DIAGNOSIS/MDM:   Vitals:    Vitals:    09/20/21 1718 09/20/21 1730 09/20/21 1800 09/20/21 1900   BP:  123/83 126/86 127/84   Pulse: 59 69 76 63   Resp: 25 16 21 23   Temp:       TempSrc:       SpO2: 100% 100% 99%    Weight:       Height:           MDM  No acute findings and blood work she has no signs at this time for any tremors no concern for DTs. Patient is mildly dehydrated in her urine we given her fluids here she is educated to rest and hydrate we will get her antibiotics based on findings consistent with concern for UTI and like to follow closely with [facility] care for any persistent issues plan will be for discharge. PROCEDURES:    Procedures      FINAL IMPRESSION      1. Peripheral edema    2. Acute cystitis without hematuria    3. H/O ETOH abuse    4. Dehydration          DISPOSITION/PLAN   DISPOSITION Decision To Discharge 09/20/2021 07:31:26 PM      PATIENT REFERRED TO:  [facility] DEPT  [facility]  [phone]    If symptoms worsen    [place] [id number] [phone]  Schedule an appointment as soon as possible for a visit   establish care; take abx and discuss diuretic      DISCHARGE MEDICATIONS:  Discharge Medication List as of 9/20/2021  7:32 PM      START taking these medications    Details   cephALEXin (KEFLEX) 500 MG capsule Take 1 capsule by mouth 2 times daily for 7 days, Disp-14 capsule, R-0Normal             (Please note that portions of this note were completed with a

## 2021-09-22 LAB — URINE CULTURE, ROUTINE: NORMAL

## 2021-09-25 LAB
EKG P AXIS: 41 DEGREES
EKG P-R INTERVAL: 160 MS
EKG Q-T INTERVAL: 342 MS
EKG QRS DURATION: 78 MS
EKG QTC CALCULATION (BAZETT): 378 MS
EKG T AXIS: 27 DEGREES

## 2021-10-29 ENCOUNTER — HOSPITAL ENCOUNTER (INPATIENT)
Age: 21
LOS: 1 days | Discharge: PSYCHIATRIC OR SUBSTANCE USE FACILITY | DRG: 918 | End: 2021-10-30
Attending: EMERGENCY MEDICINE | Admitting: INTERNAL MEDICINE
Payer: COMMERCIAL

## 2021-10-29 DIAGNOSIS — R45.851 SUICIDAL IDEATION: ICD-10-CM

## 2021-10-29 DIAGNOSIS — F10.921 ACUTE ALCOHOLIC INTOXICATION WITH DELIRIUM (HCC): ICD-10-CM

## 2021-10-29 DIAGNOSIS — T50.902A INTENTIONAL DRUG OVERDOSE, INITIAL ENCOUNTER (HCC): Primary | ICD-10-CM

## 2021-10-29 PROBLEM — E66.01 MORBID EXOGENOUS OBESITY (HCC): Status: ACTIVE | Noted: 2021-10-29

## 2021-10-29 PROBLEM — R41.89 ACUTE COGNITIVE DECLINE: Status: ACTIVE | Noted: 2021-10-29

## 2021-10-29 PROBLEM — T42.4X1A BENZODIAZEPINE OVERDOSE: Status: ACTIVE | Noted: 2021-10-29

## 2021-10-29 PROBLEM — Z72.0 TOBACCO ABUSE: Status: ACTIVE | Noted: 2021-10-29

## 2021-10-29 PROBLEM — F10.929 ALCOHOL INTOXICATION (HCC): Status: ACTIVE | Noted: 2021-10-29

## 2021-10-29 PROBLEM — N39.0 UTI (URINARY TRACT INFECTION): Status: ACTIVE | Noted: 2021-10-29

## 2021-10-29 PROBLEM — T14.91XA SUICIDE ATTEMPT (HCC): Status: ACTIVE | Noted: 2021-10-29

## 2021-10-29 PROBLEM — J45.909 ASTHMA: Status: ACTIVE | Noted: 2021-10-29

## 2021-10-29 PROBLEM — E86.0 DEHYDRATION: Status: ACTIVE | Noted: 2021-10-29

## 2021-10-29 PROBLEM — I10 ELEVATED SYSTOLIC BLOOD PRESSURE READING WITH DIAGNOSIS OF HYPERTENSION: Status: ACTIVE | Noted: 2021-10-29

## 2021-10-29 LAB
ACETAMINOPHEN LEVEL: <15 UG/ML
ALBUMIN SERPL-MCNC: 4.5 G/DL (ref 3.5–5.2)
ALP BLD-CCNC: 97 U/L (ref 35–104)
ALT SERPL-CCNC: 27 U/L (ref 5–33)
AMPHETAMINE SCREEN, URINE: NEGATIVE
ANION GAP SERPL CALCULATED.3IONS-SCNC: 12 MMOL/L (ref 7–19)
AST SERPL-CCNC: 23 U/L (ref 5–32)
BACTERIA: ABNORMAL /HPF
BARBITURATE SCREEN URINE: NEGATIVE
BASOPHILS ABSOLUTE: 0 K/UL (ref 0–0.2)
BASOPHILS RELATIVE PERCENT: 0.3 % (ref 0–1)
BENZODIAZEPINE SCREEN, URINE: NEGATIVE
BILIRUB SERPL-MCNC: 0.4 MG/DL (ref 0.2–1.2)
BILIRUBIN URINE: NEGATIVE
BLOOD, URINE: NEGATIVE
BUN BLDV-MCNC: 11 MG/DL (ref 6–20)
CALCIUM SERPL-MCNC: 9.7 MG/DL (ref 8.6–10)
CANNABINOID SCREEN URINE: NEGATIVE
CHLORIDE BLD-SCNC: 103 MMOL/L (ref 98–111)
CLARITY: CLEAR
CO2: 26 MMOL/L (ref 22–29)
COCAINE METABOLITE SCREEN URINE: NEGATIVE
COLOR: YELLOW
CREAT SERPL-MCNC: 0.7 MG/DL (ref 0.5–0.9)
CRYSTALS, UA: ABNORMAL /HPF
EOSINOPHILS ABSOLUTE: 0.1 K/UL (ref 0–0.6)
EOSINOPHILS RELATIVE PERCENT: 1.5 % (ref 0–5)
EPITHELIAL CELLS, UA: 13 /HPF (ref 0–5)
ETHANOL: 156 MG/DL (ref 0–0.08)
GFR AFRICAN AMERICAN: >59
GFR NON-AFRICAN AMERICAN: >60
GLUCOSE BLD-MCNC: 96 MG/DL (ref 74–109)
GLUCOSE URINE: NEGATIVE MG/DL
HCG(URINE) PREGNANCY TEST: NEGATIVE
HCT VFR BLD CALC: 40.6 % (ref 37–47)
HEMOGLOBIN: 13.2 G/DL (ref 12–16)
HYALINE CASTS: 5 /HPF (ref 0–8)
IMMATURE GRANULOCYTES #: 0 K/UL
KETONES, URINE: NEGATIVE MG/DL
LEUKOCYTE ESTERASE, URINE: ABNORMAL
LYMPHOCYTES ABSOLUTE: 3.2 K/UL (ref 1.1–4.5)
LYMPHOCYTES RELATIVE PERCENT: 48 % (ref 20–40)
Lab: NORMAL
MCH RBC QN AUTO: 30.3 PG (ref 27–31)
MCHC RBC AUTO-ENTMCNC: 32.5 G/DL (ref 33–37)
MCV RBC AUTO: 93.1 FL (ref 81–99)
MONOCYTES ABSOLUTE: 0.6 K/UL (ref 0–0.9)
MONOCYTES RELATIVE PERCENT: 8.3 % (ref 0–10)
NEUTROPHILS ABSOLUTE: 2.8 K/UL (ref 1.5–7.5)
NEUTROPHILS RELATIVE PERCENT: 41.6 % (ref 50–65)
NITRITE, URINE: NEGATIVE
OPIATE SCREEN URINE: NEGATIVE
PDW BLD-RTO: 13.8 % (ref 11.5–14.5)
PH UA: 6.5 (ref 5–8)
PLATELET # BLD: 251 K/UL (ref 130–400)
PMV BLD AUTO: 10.4 FL (ref 9.4–12.3)
POTASSIUM REFLEX MAGNESIUM: 3.8 MMOL/L (ref 3.5–5)
PROTEIN UA: NEGATIVE MG/DL
RBC # BLD: 4.36 M/UL (ref 4.2–5.4)
RBC UA: 0 /HPF (ref 0–4)
SALICYLATE, SERUM: <3 MG/DL (ref 3–10)
SARS-COV-2, NAAT: NOT DETECTED
SODIUM BLD-SCNC: 141 MMOL/L (ref 136–145)
SPECIFIC GRAVITY UA: 1.01 (ref 1–1.03)
TOTAL PROTEIN: 8.1 G/DL (ref 6.6–8.7)
UROBILINOGEN, URINE: 0.2 E.U./DL
WBC # BLD: 6.7 K/UL (ref 4.8–10.8)
WBC UA: 6 /HPF (ref 0–5)

## 2021-10-29 PROCEDURE — 80143 DRUG ASSAY ACETAMINOPHEN: CPT

## 2021-10-29 PROCEDURE — 80179 DRUG ASSAY SALICYLATE: CPT

## 2021-10-29 PROCEDURE — 84703 CHORIONIC GONADOTROPIN ASSAY: CPT

## 2021-10-29 PROCEDURE — 2580000003 HC RX 258: Performed by: INTERNAL MEDICINE

## 2021-10-29 PROCEDURE — 80053 COMPREHEN METABOLIC PANEL: CPT

## 2021-10-29 PROCEDURE — 85025 COMPLETE CBC W/AUTO DIFF WBC: CPT

## 2021-10-29 PROCEDURE — 1210000000 HC MED SURG R&B

## 2021-10-29 PROCEDURE — 87635 SARS-COV-2 COVID-19 AMP PRB: CPT

## 2021-10-29 PROCEDURE — 6360000002 HC RX W HCPCS: Performed by: INTERNAL MEDICINE

## 2021-10-29 PROCEDURE — 6370000000 HC RX 637 (ALT 250 FOR IP): Performed by: INTERNAL MEDICINE

## 2021-10-29 PROCEDURE — 82077 ASSAY SPEC XCP UR&BREATH IA: CPT

## 2021-10-29 PROCEDURE — HZ2ZZZZ DETOXIFICATION SERVICES FOR SUBSTANCE ABUSE TREATMENT: ICD-10-PCS | Performed by: INTERNAL MEDICINE

## 2021-10-29 PROCEDURE — 81001 URINALYSIS AUTO W/SCOPE: CPT

## 2021-10-29 PROCEDURE — 87086 URINE CULTURE/COLONY COUNT: CPT

## 2021-10-29 PROCEDURE — 80307 DRUG TEST PRSMV CHEM ANLYZR: CPT

## 2021-10-29 PROCEDURE — 2500000003 HC RX 250 WO HCPCS: Performed by: INTERNAL MEDICINE

## 2021-10-29 PROCEDURE — 36415 COLL VENOUS BLD VENIPUNCTURE: CPT

## 2021-10-29 PROCEDURE — 99283 EMERGENCY DEPT VISIT LOW MDM: CPT

## 2021-10-29 RX ORDER — ONDANSETRON 2 MG/ML
4 INJECTION INTRAMUSCULAR; INTRAVENOUS EVERY 6 HOURS PRN
Status: DISCONTINUED | OUTPATIENT
Start: 2021-10-29 | End: 2021-10-30 | Stop reason: HOSPADM

## 2021-10-29 RX ORDER — LORAZEPAM 1 MG/1
2 TABLET ORAL
Status: DISCONTINUED | OUTPATIENT
Start: 2021-10-29 | End: 2021-10-30 | Stop reason: HOSPADM

## 2021-10-29 RX ORDER — SODIUM CHLORIDE 0.9 % (FLUSH) 0.9 %
5-40 SYRINGE (ML) INJECTION EVERY 12 HOURS SCHEDULED
Status: DISCONTINUED | OUTPATIENT
Start: 2021-10-29 | End: 2021-10-30 | Stop reason: HOSPADM

## 2021-10-29 RX ORDER — SODIUM CHLORIDE, SODIUM LACTATE, POTASSIUM CHLORIDE, CALCIUM CHLORIDE 600; 310; 30; 20 MG/100ML; MG/100ML; MG/100ML; MG/100ML
INJECTION, SOLUTION INTRAVENOUS CONTINUOUS
Status: DISCONTINUED | OUTPATIENT
Start: 2021-10-29 | End: 2021-10-30 | Stop reason: HOSPADM

## 2021-10-29 RX ORDER — LORAZEPAM 1 MG/1
1 TABLET ORAL
Status: DISCONTINUED | OUTPATIENT
Start: 2021-10-29 | End: 2021-10-30 | Stop reason: HOSPADM

## 2021-10-29 RX ORDER — POLYETHYLENE GLYCOL 3350 17 G/17G
17 POWDER, FOR SOLUTION ORAL DAILY PRN
Status: DISCONTINUED | OUTPATIENT
Start: 2021-10-29 | End: 2021-10-30 | Stop reason: HOSPADM

## 2021-10-29 RX ORDER — ACETAMINOPHEN 650 MG/1
650 SUPPOSITORY RECTAL EVERY 6 HOURS PRN
Status: DISCONTINUED | OUTPATIENT
Start: 2021-10-29 | End: 2021-10-30 | Stop reason: HOSPADM

## 2021-10-29 RX ORDER — AMLODIPINE BESYLATE 5 MG/1
10 TABLET ORAL DAILY
Status: DISCONTINUED | OUTPATIENT
Start: 2021-10-29 | End: 2021-10-30 | Stop reason: HOSPADM

## 2021-10-29 RX ORDER — LORAZEPAM 2 MG/ML
4 INJECTION INTRAMUSCULAR
Status: DISCONTINUED | OUTPATIENT
Start: 2021-10-29 | End: 2021-10-30 | Stop reason: HOSPADM

## 2021-10-29 RX ORDER — ONDANSETRON 2 MG/ML
4 INJECTION INTRAMUSCULAR; INTRAVENOUS EVERY 6 HOURS PRN
Status: DISCONTINUED | OUTPATIENT
Start: 2021-10-29 | End: 2021-10-29 | Stop reason: SDUPTHER

## 2021-10-29 RX ORDER — MULTIVITAMIN WITH IRON
1 TABLET ORAL DAILY
Status: DISCONTINUED | OUTPATIENT
Start: 2021-10-29 | End: 2021-10-30 | Stop reason: HOSPADM

## 2021-10-29 RX ORDER — THIAMINE HYDROCHLORIDE 100 MG/ML
100 INJECTION, SOLUTION INTRAMUSCULAR; INTRAVENOUS DAILY
Status: DISCONTINUED | OUTPATIENT
Start: 2021-10-29 | End: 2021-10-30 | Stop reason: HOSPADM

## 2021-10-29 RX ORDER — ACETAMINOPHEN 325 MG/1
650 TABLET ORAL EVERY 6 HOURS PRN
Status: DISCONTINUED | OUTPATIENT
Start: 2021-10-29 | End: 2021-10-30 | Stop reason: HOSPADM

## 2021-10-29 RX ORDER — LORAZEPAM 1 MG/1
3 TABLET ORAL
Status: DISCONTINUED | OUTPATIENT
Start: 2021-10-29 | End: 2021-10-30 | Stop reason: HOSPADM

## 2021-10-29 RX ORDER — LORAZEPAM 2 MG/ML
2 INJECTION INTRAMUSCULAR
Status: DISCONTINUED | OUTPATIENT
Start: 2021-10-29 | End: 2021-10-30 | Stop reason: HOSPADM

## 2021-10-29 RX ORDER — LORAZEPAM 2 MG/ML
1 INJECTION INTRAMUSCULAR
Status: DISCONTINUED | OUTPATIENT
Start: 2021-10-29 | End: 2021-10-30 | Stop reason: HOSPADM

## 2021-10-29 RX ORDER — ONDANSETRON 4 MG/1
4 TABLET, ORALLY DISINTEGRATING ORAL EVERY 8 HOURS PRN
Status: DISCONTINUED | OUTPATIENT
Start: 2021-10-29 | End: 2021-10-30 | Stop reason: HOSPADM

## 2021-10-29 RX ORDER — 0.9 % SODIUM CHLORIDE 0.9 %
1000 INTRAVENOUS SOLUTION INTRAVENOUS ONCE
Status: DISCONTINUED | OUTPATIENT
Start: 2021-10-29 | End: 2021-10-30 | Stop reason: HOSPADM

## 2021-10-29 RX ORDER — SODIUM CHLORIDE 0.9 % (FLUSH) 0.9 %
5-40 SYRINGE (ML) INJECTION PRN
Status: DISCONTINUED | OUTPATIENT
Start: 2021-10-29 | End: 2021-10-30 | Stop reason: HOSPADM

## 2021-10-29 RX ORDER — LORAZEPAM 1 MG/1
4 TABLET ORAL
Status: DISCONTINUED | OUTPATIENT
Start: 2021-10-29 | End: 2021-10-30 | Stop reason: HOSPADM

## 2021-10-29 RX ORDER — SODIUM CHLORIDE 9 MG/ML
25 INJECTION, SOLUTION INTRAVENOUS PRN
Status: DISCONTINUED | OUTPATIENT
Start: 2021-10-29 | End: 2021-10-30 | Stop reason: HOSPADM

## 2021-10-29 RX ORDER — LORAZEPAM 2 MG/ML
3 INJECTION INTRAMUSCULAR
Status: DISCONTINUED | OUTPATIENT
Start: 2021-10-29 | End: 2021-10-30 | Stop reason: HOSPADM

## 2021-10-29 RX ADMIN — THERA TABS 1 TABLET: TAB at 14:35

## 2021-10-29 RX ADMIN — ENOXAPARIN SODIUM 40 MG: 40 INJECTION SUBCUTANEOUS at 14:35

## 2021-10-29 RX ADMIN — THIAMINE HYDROCHLORIDE 100 MG: 100 INJECTION, SOLUTION INTRAMUSCULAR; INTRAVENOUS at 14:34

## 2021-10-29 RX ADMIN — ACETAMINOPHEN 650 MG: 325 TABLET ORAL at 14:52

## 2021-10-29 RX ADMIN — FAMOTIDINE 20 MG: 10 INJECTION, SOLUTION INTRAVENOUS at 20:43

## 2021-10-29 RX ADMIN — LORAZEPAM 1 MG: 1 TABLET ORAL at 22:50

## 2021-10-29 RX ADMIN — SODIUM CHLORIDE, PRESERVATIVE FREE 10 ML: 5 INJECTION INTRAVENOUS at 20:43

## 2021-10-29 RX ADMIN — FAMOTIDINE 20 MG: 10 INJECTION, SOLUTION INTRAVENOUS at 14:34

## 2021-10-29 RX ADMIN — SODIUM CHLORIDE, PRESERVATIVE FREE 2000 MG: 5 INJECTION INTRAVENOUS at 14:34

## 2021-10-29 RX ADMIN — SODIUM CHLORIDE, POTASSIUM CHLORIDE, SODIUM LACTATE AND CALCIUM CHLORIDE: 600; 310; 30; 20 INJECTION, SOLUTION INTRAVENOUS at 15:15

## 2021-10-29 RX ADMIN — ONDANSETRON HYDROCHLORIDE 4 MG: 2 SOLUTION INTRAMUSCULAR; INTRAVENOUS at 14:52

## 2021-10-29 ASSESSMENT — PAIN SCALES - GENERAL
PAINLEVEL_OUTOF10: 3
PAINLEVEL_OUTOF10: 8
PAINLEVEL_OUTOF10: 3

## 2021-10-29 ASSESSMENT — PAIN DESCRIPTION - LOCATION: LOCATION: ABDOMEN

## 2021-10-29 ASSESSMENT — PAIN DESCRIPTION - PAIN TYPE: TYPE: ACUTE PAIN

## 2021-10-29 NOTE — H&P
HISTORY AND PHYSICAL             Date: 10/29/2021        Patient Name: Barbra Olivares     YOB: 2000      Age:  19 y.o. Chief Complaint     Chief Complaint   Patient presents with    Drug Overdose     Xanax. unknown amount and dose     Mental Health Problem    decided she did not want to live anymore and took excess xanax tablets and drank alcohol excessively   Then came to the ER>       History Obtained From   Patient     History of Present Illness   Multiple previous issues, with depression and self harm and suicidal ideations and suicidal attempts. She was increasingly depressed and dysphoric over her whole \"life\" and did not feel that life was worth being a part of and decided to drink to inebriation and then take excess and all of the xanax tablets she had with. She was then concerned and thought she probably needed to get help and evaluation and came to er for this. She is not agitated. Sedate   Not as anxious as before. She is agreeable to staying in hospital and getting help for her mental health issues. Past Medical History     Past Medical History:   Diagnosis Date    Asthma         Past Surgical History     Past Surgical History:   Procedure Laterality Date    PLEURAL SCARIFICATION          Medications Prior to Admission      unclear as to what medications she takes if any and if they are prescribed for her or not. Code status reviewed and she is a full code and wishes to live     Allergies   Ritalin [methylphenidate]    Social History     Social History     Tobacco History     Smoking Status  Current Every Day Smoker Smoking Tobacco Type  Cigars    Smokeless Tobacco Use  Never Used          Alcohol History     Alcohol Use Status  Yes          Drug Use     Drug Use Status  Never          Sexual Activity     Sexually Active  Yes Partners  Male                Family History   History reviewed. No pertinent family history.     Review of Systems   Review of Systems K/uL    Basophils Absolute 0.00 0.00 - 0.20 K/uL   Comprehensive Metabolic Panel w/ Reflex to MG    Collection Time: 10/29/21  4:00 AM   Result Value Ref Range    Sodium 141 136 - 145 mmol/L    Potassium reflex Magnesium 3.8 3.5 - 5.0 mmol/L    Chloride 103 98 - 111 mmol/L    CO2 26 22 - 29 mmol/L    Anion Gap 12 7 - 19 mmol/L    Glucose 96 74 - 109 mg/dL    BUN 11 6 - 20 mg/dL    CREATININE 0.7 0.5 - 0.9 mg/dL    GFR Non-African American >60 >60    GFR African American >59 >59    Calcium 9.7 8.6 - 10.0 mg/dL    Total Protein 8.1 6.6 - 8.7 g/dL    Albumin 4.5 3.5 - 5.2 g/dL    Total Bilirubin 0.4 0.2 - 1.2 mg/dL    Alkaline Phosphatase 97 35 - 104 U/L    ALT 27 5 - 33 U/L    AST 23 5 - 32 U/L   Ethanol    Collection Time: 10/29/21  4:00 AM   Result Value Ref Range    Ethanol Lvl 329 mg/dL   Salicylate    Collection Time: 10/29/21  4:00 AM   Result Value Ref Range    Salicylate, Serum <2.8 3.0 - 10.0 mg/dL   Acetaminophen Level    Collection Time: 10/29/21  4:00 AM   Result Value Ref Range    Acetaminophen Level <15 ug/mL   COVID-19, Rapid    Collection Time: 10/29/21  4:02 AM    Specimen: Nasopharyngeal Swab   Result Value Ref Range    SARS-CoV-2, NAAT Not Detected Not Detected   Urine Drug Screen    Collection Time: 10/29/21  4:25 AM   Result Value Ref Range    Amphetamine Screen, Urine Negative Negative <1000 ng/mL    Barbiturate Screen, Ur Negative Negative < 200 ng/mL    Benzodiazepine Screen, Urine Negative Negative <100 ng/mL    Cannabinoid Scrn, Ur Negative Negative <50 ng/mL    Cocaine Metabolite Screen, Urine Negative Negative <300 ng/mL    Opiate Scrn, Ur Negative Negative < 300 ng/mL    Drug Screen Comment: see below    Pregnancy, Urine    Collection Time: 10/29/21  4:25 AM   Result Value Ref Range    HCG(Urine) Pregnancy Test Negative    Urinalysis Reflex to Culture    Collection Time: 10/29/21  4:25 AM    Specimen: Urine, straight catheter   Result Value Ref Range    Color, UA YELLOW Straw/Yellow Clarity, UA Clear Clear    Glucose, Ur Negative Negative mg/dL    Bilirubin Urine Negative Negative    Ketones, Urine Negative Negative mg/dL    Specific Gravity, UA 1.013 1.005 - 1.030    Blood, Urine Negative Negative    pH, UA 6.5 5.0 - 8.0    Protein, UA Negative Negative mg/dL    Urobilinogen, Urine 0.2 <2.0 E.U./dL    Nitrite, Urine Negative Negative    Leukocyte Esterase, Urine MODERATE (A) Negative   Microscopic Urinalysis    Collection Time: 10/29/21  4:25 AM   Result Value Ref Range    Bacteria, UA 1+ (A) None Seen /HPF    Crystals, UA NEG (A) None Seen /HPF    Hyaline Casts, UA 5 0 - 8 /HPF    WBC, UA 6 (H) 0 - 5 /HPF    RBC, UA 0 0 - 4 /HPF    Epithelial Cells, UA 13 0 - 5 /HPF        Imaging/Diagnostics Last 24 Hours   Noted above     Assessment      Hospital Problems         Last Modified POA    * (Principal) Drug overdose, intentional, initial encounter ([facility] [address] 10/29/2021 Yes    Dehydration 10/29/2021 Yes    Alcohol intoxication ([facility] [address] 10/29/2021 Yes    Benzodiazepine overdose 10/29/2021 Yes    Suicidal ideations 10/29/2021 Yes    Elevated systolic blood pressure reading with diagnosis of hypertension 10/29/2021 Yes    Morbid exogenous obesity ([address] 10/29/2021 Yes    Tobacco abuse 10/29/2021 Yes    Acute cognitive decline 10/29/2021 Yes    Asthma 10/29/2021 Yes    UTI (urinary tract infection) 10/29/2021 Yes          Plan   1. Patient is being admitted for intentional drug and alcohol abuse disorder and overdose     Admit to icu  Sitter   Suicidal ideations   Monitor cardiopulmonary status     2. Dehydration and needs volume repletion and detoxify system and give meds to help thru withdrawal.     3. Concentrated and abnormal urinalysis   ? uti   Start abx and send off urine culture. 4.  Tobacco abuse and asthma     5. Follow up on ethanol level     6. Elevated blood pressure and control bp with meds     7. Pulmonary toilet     8.   dvt and gi prophylaxis.          Consultations Ordered:  IP CONSULT TO SOCIAL WORK    Electronically signed by Dianne Adamson MD on 10/29/21 at 5:46 AM CDT

## 2021-10-29 NOTE — CARE COORDINATION
Patient too drowsy to assess.   Electronically signed by Hailey Randolph RN, BSN on 10/29/2021 at 2:49 PM

## 2021-10-29 NOTE — ED NOTES
Pt girlfriend called and given update per pt request. Pt will be admitted.       Radha Gandhi RN  10/29/21 8913

## 2021-10-29 NOTE — Clinical Note
Patient Class: Inpatient [101]   REQUIRED: Diagnosis: Drug overdose, intentional, initial encounter [facility]) [[id number]]   Estimated Length of Stay: Estimated stay of more than 2 midnights   Admitting Provider: Barby Pate [[id number]]   Telemetry/Cardiac Monitoring Required?: Yes

## 2021-10-29 NOTE — ED NOTES
Patient sleeping, rouses to voice, speech slurred. Patient confused to time and place. 1:1 observation in place.       Bryson Vaughan RN  10/29/21 9585

## 2021-10-29 NOTE — ED NOTES
ASSESSMENT:    PT ALERT/ORIENTED X4. DROWSY AT THIS TIME. PUPILS EQUAL/REACTIVE    SKIN:  WARM/DRY PINK CAPILLARY REFILL < 2SECS    CARDIAC:  S1 S2 NOTED     LUNGS: CLEAR UPPER AND LOWER LOBES, RESPIRATIONS EVEN/UNLABORED     ABDOMEN: BOWEL SOUNDS NOTED UPPER AND LOWER QUADRANTS                     SOFT AND NONTENDER. EXTREMITIES:  BILATERAL DP AND PT AND NO EDEMA NOTED. NO DISTRESS NOTED. SIDE RAILS UP AND CALL LIGHT IN REACH.        Constanza Vargas RN  10/29/21 2031

## 2021-10-29 NOTE — ED PROVIDER NOTES
[facility] EMERGENCY DEPT  EMERGENCY DEPARTMENT ENCOUNTER      Pt Name: Alexandru Dang  MRN: [id number]  [place] 2000  Date of evaluation: 10/29/2021  Provider: Lexy Mesa MD    CHIEF COMPLAINT       Chief Complaint   Patient presents with    Drug Overdose     Xanax. unknown amount and dose     Mental Health Problem         HISTORY OF PRESENT ILLNESS   (Location/Symptom, Timing/Onset,Context/Setting, Quality, Duration, Modifying Factors, Severity)  Note limiting factors. Alexandru Dang is a 19 y.o. female who presents to the emergency department for evaluation after she took an unknown amount of xanax tonight along with alcohol. States she took the xanax because she doesn't want to be here anymore. Pt cannot provide much additional history due to ams from intoxication. HPI    NursingNotes were reviewed. REVIEW OF SYSTEMS    (2-9 systems for level 4, 10 or more for level 5)     Review of Systems   Unable to perform ROS: Other       A complete review of systems was performed and is negative except as noted above in the HPI. PAST MEDICAL HISTORY     Past Medical History:   Diagnosis Date    Asthma          SURGICAL HISTORY       Past Surgical History:   Procedure Laterality Date    PLEURAL SCARIFICATION           CURRENT MEDICATIONS       Previous Medications    No medications on file       ALLERGIES     Ritalin [methylphenidate]    FAMILY HISTORY     History reviewed. No pertinent family history. SOCIAL HISTORY       Social History     Socioeconomic History    Marital status:      Spouse name: None    Number of children: None    Years of education: None    Highest education level: None   Occupational History    None   Tobacco Use    Smoking status: Current Every Day Smoker     Types: Cigars    Smokeless tobacco: Never Used   Vaping Use    Vaping Use: Never used   Substance and Sexual Activity    Alcohol use:  Yes    Drug use: Never    Sexual activity: Yes     Partners: Male   Other Topics Concern    None   Social History Narrative    None     Social Determinants of Health     Financial Resource Strain:     Difficulty of Paying Living Expenses:    Food Insecurity:     Worried About Running Out of Food in the Last Year:     [address] in the Last Year:    Transportation Needs:     Lack of Transportation (Medical):  Lack of Transportation (Non-Medical):    Physical Activity:     Days of Exercise per Week:     Minutes of Exercise per Session:    Stress:     Feeling of Stress :    Social Connections:     Frequency of Communication with Friends and Family:     Frequency of Social Gatherings with Friends and Family:     Attends [employer] Services:     Active Member of Clubs or Organizations:     Attends Club or Organization Meetings:     Marital Status:    Intimate Partner Violence:     Fear of Current or Ex-Partner:     Emotionally Abused:     Physically Abused:     Sexually Abused:        SCREENINGS             PHYSICAL EXAM    (up to 7 for level 4, 8 or more for level 5)     ED Triage Vitals [10/29/21 0349]   BP Temp Temp src Pulse Resp SpO2 Height Weight   (!) 159/99 98.8 °F (37.1 °C) -- 119 12 94 % -- 230 lb (104.3 kg)       Physical Exam  Vitals and nursing note reviewed. Constitutional:       General: She is not in acute distress. Appearance: She is well-developed. She is ill-appearing. She is not toxic-appearing or diaphoretic. HENT:      Head: Normocephalic and atraumatic. Eyes:      General: No scleral icterus. Right eye: No discharge. Left eye: No discharge. Pupils: Pupils are equal, round, and reactive to light. Cardiovascular:      Rate and Rhythm: Normal rate and regular rhythm. Pulmonary:      Effort: Pulmonary effort is normal. No respiratory distress. Breath sounds: No stridor. Abdominal:      General: There is no distension. Musculoskeletal:         General: No deformity. Normal range of motion. returned as of this dictation. EMERGENCY DEPARTMENT COURSE and DIFFERENTIALDIAGNOSIS/MDM:   Vitals:    Vitals:    10/29/21 0349   BP: (!) 159/99   Pulse: 119   Resp: 12   Temp: 98.8 °F (37.1 °C)   SpO2: 94%   Weight: 230 lb (104.3 kg)       MDM       Patient is somnolent but arousable. Speech is slurred and almost unintelligible. Unable to provide much specific history from patient. Drug screen negative. Ethanol at 156. Discussed with hospitalist for continued medical observation and management with plan for psychiatry evaluation once sober and medically cleared. Based on the evaluation and work-up here patient is felt to require further monitoring, work-up, or treatment that is available in the emergency department. Case was discussed with hospitalist who agrees for observation or admission for further management. Treatment and stabilization as necessary were provided in the emergency department prior to transfer of care to the medicine service. CONSULTS:  IP CONSULT TO SOCIAL WORK    PROCEDURES:  Unless otherwise notedbelow, none     Procedures  CRITICAL CARE TIME   Total Critical Care time was 30 minutes, excluding separately reportable procedures. There was a high probability of clinically significant/life threatening deterioration in the patient's condition which required my urgent intervention. FINAL IMPRESSION     1. Intentional drug overdose, initial encounter ([facility] [address]    2. Acute alcoholic intoxication with delirium ([facility] [address]    3. Suicidal ideation          DISPOSITION/PLAN   DISPOSITION        PATIENT REFERRED TO:  No follow-up provider specified.     DISCHARGE MEDICATIONS:  New Prescriptions    No medications on file          (Please note that portions of this note were completed with a voice recognition program.  Efforts were made to edit the dictations butoccasionally words are mis-transcribed.)    Lexy Mesa MD (electronically signed)  Emergency Physician          Marco Jiménez Kelly Vickers MD  10/29/21 8087
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
## 2021-10-30 ENCOUNTER — HOSPITAL ENCOUNTER (INPATIENT)
Age: 21
LOS: 2 days | Discharge: HOME | DRG: 885 | End: 2021-11-01
Attending: PSYCHIATRY & NEUROLOGY | Admitting: PSYCHIATRY & NEUROLOGY
Payer: COMMERCIAL

## 2021-10-30 VITALS
BODY MASS INDEX: 39.27 KG/M2 | RESPIRATION RATE: 18 BRPM | TEMPERATURE: 96.4 F | WEIGHT: 230 LBS | HEIGHT: 64 IN | HEART RATE: 75 BPM | DIASTOLIC BLOOD PRESSURE: 95 MMHG | OXYGEN SATURATION: 98 % | SYSTOLIC BLOOD PRESSURE: 131 MMHG

## 2021-10-30 PROBLEM — J45.909 ASTHMA: Status: RESOLVED | Noted: 2021-10-29 | Resolved: 2021-10-30

## 2021-10-30 PROBLEM — Z72.0 TOBACCO ABUSE: Status: RESOLVED | Noted: 2021-10-29 | Resolved: 2021-10-30

## 2021-10-30 PROBLEM — R41.89 ACUTE COGNITIVE DECLINE: Status: RESOLVED | Noted: 2021-10-29 | Resolved: 2021-10-30

## 2021-10-30 PROBLEM — I10 ELEVATED SYSTOLIC BLOOD PRESSURE READING WITH DIAGNOSIS OF HYPERTENSION: Status: RESOLVED | Noted: 2021-10-29 | Resolved: 2021-10-30

## 2021-10-30 PROBLEM — N39.0 UTI (URINARY TRACT INFECTION): Status: RESOLVED | Noted: 2021-10-29 | Resolved: 2021-10-30

## 2021-10-30 PROBLEM — E66.01 MORBID EXOGENOUS OBESITY (HCC): Status: RESOLVED | Noted: 2021-10-29 | Resolved: 2021-10-30

## 2021-10-30 PROBLEM — T14.91XA SUICIDE ATTEMPT (HCC): Status: ACTIVE | Noted: 2021-10-30

## 2021-10-30 PROBLEM — T50.902A DRUG OVERDOSE, INTENTIONAL, INITIAL ENCOUNTER (HCC): Status: RESOLVED | Noted: 2021-10-29 | Resolved: 2021-10-30

## 2021-10-30 PROBLEM — T14.91XA SUICIDE ATTEMPT (HCC): Status: RESOLVED | Noted: 2021-10-29 | Resolved: 2021-10-30

## 2021-10-30 PROBLEM — F10.929 ALCOHOL INTOXICATION (HCC): Status: RESOLVED | Noted: 2021-10-29 | Resolved: 2021-10-30

## 2021-10-30 PROBLEM — T42.4X1A BENZODIAZEPINE OVERDOSE: Status: RESOLVED | Noted: 2021-10-29 | Resolved: 2021-10-30

## 2021-10-30 PROBLEM — E86.0 DEHYDRATION: Status: RESOLVED | Noted: 2021-10-29 | Resolved: 2021-10-30

## 2021-10-30 LAB
ALBUMIN SERPL-MCNC: 3.7 G/DL (ref 3.5–5.2)
ALP BLD-CCNC: 79 U/L (ref 35–104)
ALT SERPL-CCNC: 21 U/L (ref 5–33)
ANION GAP SERPL CALCULATED.3IONS-SCNC: 10 MMOL/L (ref 7–19)
AST SERPL-CCNC: 17 U/L (ref 5–32)
BILIRUB SERPL-MCNC: 0.4 MG/DL (ref 0.2–1.2)
BUN BLDV-MCNC: 11 MG/DL (ref 6–20)
CALCIUM SERPL-MCNC: 8.7 MG/DL (ref 8.6–10)
CHLORIDE BLD-SCNC: 103 MMOL/L (ref 98–111)
CO2: 25 MMOL/L (ref 22–29)
CREAT SERPL-MCNC: 0.7 MG/DL (ref 0.5–0.9)
ETHANOL: <10 MG/DL (ref 0–0.08)
GFR AFRICAN AMERICAN: >59
GFR NON-AFRICAN AMERICAN: >60
GLUCOSE BLD-MCNC: 112 MG/DL (ref 74–109)
HCT VFR BLD CALC: 38.4 % (ref 37–47)
HEMOGLOBIN: 12.4 G/DL (ref 12–16)
INR BLD: 1 (ref 0.88–1.18)
LACTIC ACID: 0.9 MMOL/L (ref 0.5–1.9)
MAGNESIUM: 1.7 MG/DL (ref 1.6–2.6)
MCH RBC QN AUTO: 30.1 PG (ref 27–31)
MCHC RBC AUTO-ENTMCNC: 32.3 G/DL (ref 33–37)
MCV RBC AUTO: 93.2 FL (ref 81–99)
PDW BLD-RTO: 13.8 % (ref 11.5–14.5)
PLATELET # BLD: 236 K/UL (ref 130–400)
PMV BLD AUTO: 10.4 FL (ref 9.4–12.3)
POTASSIUM REFLEX MAGNESIUM: 3.4 MMOL/L (ref 3.5–5)
PROTHROMBIN TIME: 13.4 SEC (ref 12–14.6)
RBC # BLD: 4.12 M/UL (ref 4.2–5.4)
SODIUM BLD-SCNC: 138 MMOL/L (ref 136–145)
TOTAL PROTEIN: 6.5 G/DL (ref 6.6–8.7)
WBC # BLD: 4.6 K/UL (ref 4.8–10.8)

## 2021-10-30 PROCEDURE — 83605 ASSAY OF LACTIC ACID: CPT

## 2021-10-30 PROCEDURE — 82077 ASSAY SPEC XCP UR&BREATH IA: CPT

## 2021-10-30 PROCEDURE — 85027 COMPLETE CBC AUTOMATED: CPT

## 2021-10-30 PROCEDURE — 6370000000 HC RX 637 (ALT 250 FOR IP): Performed by: PSYCHIATRY & NEUROLOGY

## 2021-10-30 PROCEDURE — 36415 COLL VENOUS BLD VENIPUNCTURE: CPT

## 2021-10-30 PROCEDURE — 6360000002 HC RX W HCPCS: Performed by: INTERNAL MEDICINE

## 2021-10-30 PROCEDURE — 6370000000 HC RX 637 (ALT 250 FOR IP): Performed by: INTERNAL MEDICINE

## 2021-10-30 PROCEDURE — 85610 PROTHROMBIN TIME: CPT

## 2021-10-30 PROCEDURE — 1240000000 HC EMOTIONAL WELLNESS R&B

## 2021-10-30 PROCEDURE — 2500000003 HC RX 250 WO HCPCS: Performed by: INTERNAL MEDICINE

## 2021-10-30 PROCEDURE — 83735 ASSAY OF MAGNESIUM: CPT

## 2021-10-30 PROCEDURE — 99231 SBSQ HOSP IP/OBS SF/LOW 25: CPT | Performed by: PSYCHIATRY & NEUROLOGY

## 2021-10-30 PROCEDURE — 80053 COMPREHEN METABOLIC PANEL: CPT

## 2021-10-30 PROCEDURE — 2580000003 HC RX 258: Performed by: INTERNAL MEDICINE

## 2021-10-30 RX ORDER — SODIUM CHLORIDE 9 MG/ML
25 INJECTION, SOLUTION INTRAVENOUS PRN
Status: CANCELLED | OUTPATIENT
Start: 2021-10-30

## 2021-10-30 RX ORDER — LORAZEPAM 2 MG/ML
1 INJECTION INTRAMUSCULAR
Status: CANCELLED | OUTPATIENT
Start: 2021-10-30

## 2021-10-30 RX ORDER — POLYETHYLENE GLYCOL 3350 17 G/17G
17 POWDER, FOR SOLUTION ORAL DAILY PRN
Status: DISCONTINUED | OUTPATIENT
Start: 2021-10-30 | End: 2021-11-01 | Stop reason: HOSPADM

## 2021-10-30 RX ORDER — LORAZEPAM 2 MG/ML
3 INJECTION INTRAMUSCULAR
Status: CANCELLED | OUTPATIENT
Start: 2021-10-30

## 2021-10-30 RX ORDER — THIAMINE HYDROCHLORIDE 100 MG/ML
100 INJECTION, SOLUTION INTRAMUSCULAR; INTRAVENOUS DAILY
Status: CANCELLED | OUTPATIENT
Start: 2021-10-31

## 2021-10-30 RX ORDER — LORAZEPAM 1 MG/1
3 TABLET ORAL
Status: CANCELLED | OUTPATIENT
Start: 2021-10-30

## 2021-10-30 RX ORDER — LORAZEPAM 1 MG/1
2 TABLET ORAL
Status: CANCELLED | OUTPATIENT
Start: 2021-10-30

## 2021-10-30 RX ORDER — LORAZEPAM 1 MG/1
4 TABLET ORAL
Status: CANCELLED | OUTPATIENT
Start: 2021-10-30

## 2021-10-30 RX ORDER — POTASSIUM CHLORIDE 7.45 MG/ML
10 INJECTION INTRAVENOUS PRN
Status: CANCELLED | OUTPATIENT
Start: 2021-10-30

## 2021-10-30 RX ORDER — POLYETHYLENE GLYCOL 3350 17 G/17G
17 POWDER, FOR SOLUTION ORAL DAILY PRN
Status: CANCELLED | OUTPATIENT
Start: 2021-10-30

## 2021-10-30 RX ORDER — POTASSIUM CHLORIDE 7.45 MG/ML
10 INJECTION INTRAVENOUS PRN
Status: DISCONTINUED | OUTPATIENT
Start: 2021-10-30 | End: 2021-10-30 | Stop reason: HOSPADM

## 2021-10-30 RX ORDER — ONDANSETRON 2 MG/ML
4 INJECTION INTRAMUSCULAR; INTRAVENOUS EVERY 6 HOURS PRN
Status: CANCELLED | OUTPATIENT
Start: 2021-10-30

## 2021-10-30 RX ORDER — POTASSIUM CHLORIDE 20 MEQ/1
40 TABLET, EXTENDED RELEASE ORAL PRN
Status: CANCELLED | OUTPATIENT
Start: 2021-10-30

## 2021-10-30 RX ORDER — ONDANSETRON 4 MG/1
4 TABLET, ORALLY DISINTEGRATING ORAL EVERY 8 HOURS PRN
Status: CANCELLED | OUTPATIENT
Start: 2021-10-30

## 2021-10-30 RX ORDER — PRAZOSIN HYDROCHLORIDE 1 MG/1
1 CAPSULE ORAL NIGHTLY
Status: DISCONTINUED | OUTPATIENT
Start: 2021-10-30 | End: 2021-10-31

## 2021-10-30 RX ORDER — POTASSIUM CHLORIDE 20 MEQ/1
40 TABLET, EXTENDED RELEASE ORAL PRN
Status: DISCONTINUED | OUTPATIENT
Start: 2021-10-30 | End: 2021-10-30 | Stop reason: HOSPADM

## 2021-10-30 RX ORDER — ACETAMINOPHEN 650 MG/1
650 SUPPOSITORY RECTAL EVERY 6 HOURS PRN
Status: CANCELLED | OUTPATIENT
Start: 2021-10-30

## 2021-10-30 RX ORDER — NICOTINE 21 MG/24HR
1 PATCH, TRANSDERMAL 24 HOURS TRANSDERMAL DAILY
Status: DISCONTINUED | OUTPATIENT
Start: 2021-10-30 | End: 2021-11-01 | Stop reason: HOSPADM

## 2021-10-30 RX ORDER — ACETAMINOPHEN 325 MG/1
650 TABLET ORAL EVERY 6 HOURS PRN
Status: CANCELLED | OUTPATIENT
Start: 2021-10-30

## 2021-10-30 RX ORDER — TRAZODONE HYDROCHLORIDE 50 MG/1
50 TABLET ORAL NIGHTLY
Status: DISCONTINUED | OUTPATIENT
Start: 2021-10-30 | End: 2021-11-01 | Stop reason: HOSPADM

## 2021-10-30 RX ORDER — LORAZEPAM 2 MG/ML
4 INJECTION INTRAMUSCULAR
Status: CANCELLED | OUTPATIENT
Start: 2021-10-30

## 2021-10-30 RX ORDER — ACETAMINOPHEN 325 MG/1
650 TABLET ORAL EVERY 4 HOURS PRN
Status: DISCONTINUED | OUTPATIENT
Start: 2021-10-30 | End: 2021-11-01 | Stop reason: HOSPADM

## 2021-10-30 RX ORDER — LORAZEPAM 2 MG/ML
2 INJECTION INTRAMUSCULAR
Status: CANCELLED | OUTPATIENT
Start: 2021-10-30

## 2021-10-30 RX ORDER — AMLODIPINE BESYLATE 10 MG/1
10 TABLET ORAL DAILY
Status: CANCELLED | OUTPATIENT
Start: 2021-10-31

## 2021-10-30 RX ORDER — SODIUM CHLORIDE 0.9 % (FLUSH) 0.9 %
5-40 SYRINGE (ML) INJECTION EVERY 12 HOURS SCHEDULED
Status: CANCELLED | OUTPATIENT
Start: 2021-10-30

## 2021-10-30 RX ORDER — SODIUM CHLORIDE 0.9 % (FLUSH) 0.9 %
5-40 SYRINGE (ML) INJECTION PRN
Status: CANCELLED | OUTPATIENT
Start: 2021-10-30

## 2021-10-30 RX ORDER — LORAZEPAM 1 MG/1
1 TABLET ORAL
Status: CANCELLED | OUTPATIENT
Start: 2021-10-30

## 2021-10-30 RX ORDER — MULTIVITAMIN WITH IRON
1 TABLET ORAL DAILY
Status: CANCELLED | OUTPATIENT
Start: 2021-10-31

## 2021-10-30 RX ORDER — SODIUM CHLORIDE, SODIUM LACTATE, POTASSIUM CHLORIDE, CALCIUM CHLORIDE 600; 310; 30; 20 MG/100ML; MG/100ML; MG/100ML; MG/100ML
INJECTION, SOLUTION INTRAVENOUS CONTINUOUS
Status: CANCELLED | OUTPATIENT
Start: 2021-10-30

## 2021-10-30 RX ADMIN — FAMOTIDINE 20 MG: 10 INJECTION, SOLUTION INTRAVENOUS at 09:03

## 2021-10-30 RX ADMIN — ONDANSETRON HYDROCHLORIDE 4 MG: 2 SOLUTION INTRAMUSCULAR; INTRAVENOUS at 05:33

## 2021-10-30 RX ADMIN — PRAZOSIN HYDROCHLORIDE 1 MG: 1 CAPSULE ORAL at 21:22

## 2021-10-30 RX ADMIN — THIAMINE HYDROCHLORIDE 100 MG: 100 INJECTION, SOLUTION INTRAMUSCULAR; INTRAVENOUS at 09:03

## 2021-10-30 RX ADMIN — ENOXAPARIN SODIUM 40 MG: 40 INJECTION SUBCUTANEOUS at 15:09

## 2021-10-30 RX ADMIN — POTASSIUM CHLORIDE 40 MEQ: 1500 TABLET, EXTENDED RELEASE ORAL at 05:28

## 2021-10-30 RX ADMIN — SODIUM CHLORIDE, PRESERVATIVE FREE 2000 MG: 5 INJECTION INTRAVENOUS at 15:08

## 2021-10-30 RX ADMIN — TRAZODONE HYDROCHLORIDE 50 MG: 50 TABLET ORAL at 21:22

## 2021-10-30 ASSESSMENT — PATIENT HEALTH QUESTIONNAIRE - PHQ9: SUM OF ALL RESPONSES TO PHQ QUESTIONS 1-9: 26

## 2021-10-30 ASSESSMENT — LIFESTYLE VARIABLES: HISTORY_ALCOHOL_USE: YES

## 2021-10-30 ASSESSMENT — SLEEP AND FATIGUE QUESTIONNAIRES
RESTFUL SLEEP: NO
DO YOU HAVE DIFFICULTY SLEEPING: YES
DIFFICULTY STAYING ASLEEP: YES
DO YOU USE A SLEEP AID: NO
DIFFICULTY FALLING ASLEEP: YES
SLEEP PATTERN: DIFFICULTY FALLING ASLEEP;NIGHTMARES/TERRORS;DISTURBED/INTERRUPTED SLEEP
DIFFICULTY ARISING: YES

## 2021-10-30 ASSESSMENT — PAIN SCALES - GENERAL: PAINLEVEL_OUTOF10: 0
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
## 2021-10-30 NOTE — PROGRESS NOTES
IV and tele removed. Report called to [facility]. Transport and security called to take patient to 6th floor. She is understanding and calm about the transfer.
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
## 2021-10-30 NOTE — DISCHARGE SUMMARY
Discharge Summary    Sasha Page  :  2000  MRN:  [id number]    Admit date:  10/29/2021  Discharge date:      Admitting Physician:  Mike Bowers DO    Advance Directive: Full Code    Consults: psychiatry    Primary Care Physician:  No primary care provider on file. Discharge Diagnoses:  Principal Problem (Resolved):    Drug overdose, intentional, initial encounter [facility])  Active Problems:    Suicidal ideations  Resolved Problems:    Dehydration    Alcohol intoxication ([facility] [address]    Benzodiazepine overdose    Elevated systolic blood pressure reading with diagnosis of hypertension    Morbid exogenous obesity ([facility] [address]    Tobacco abuse    Acute cognitive decline    Asthma    UTI (urinary tract infection)    Suicide attempt ([facility]      Significant Diagnostic Studies:   No results found. CBC: Recent Labs     10/29/21  0400 10/30/21  0320   WBC 6.7 4.6*   HGB 13.2 12.4    236     BMP:    Recent Labs     10/29/21  0400 10/30/21  0320    138   K 3.8 3.4*    103   CO2 26 25   BUN 11 11   CREATININE 0.7 0.7   GLUCOSE 96 112*     INR:   Recent Labs     10/30/21  0320   INR 1.00     Lipids: No results for input(s): CHOL, HDL in the last 72 hours. Invalid input(s): LDLCALCU  ABGs:No results for input(s): PHART, AOQ5YGL, PO2ART, XBU7HYC, BEART, HGBAE, H5HCROMI, CARBOXHGBART, 02THERAPY in the last 72 hours. HgBA1c:  No results for input(s): LABA1C in the last 72 hours. Procedures: None  Hospital Course: Ms. Javi Fuller was admitted on 10/29 after consuming alcohol and Xanax with the intention of harming herself. She was monitored in the ER and had resolution of her alcohol intoxication. She did not have any further setbacks from her overdose. [facility] On 10/30 psychiatry evaluated her and agreed to take her to inpatient psych.     Physical Exam:  Vital Signs: /60   Pulse 71   Temp 97.7 °F (36.5 °C)   Resp 18   Ht 5' 4\" (1.626 m)   Wt 230 lb (104.3 kg)   LMP  (LMP Unknown)   SpO2 98%   BMI 39.48 kg/m²   General appearance:. Alert and Cooperative   HEENT: Normocephalic. Chest: clear to auscultation bilaterally without wheezes or rhonchi. Cardiac: Normal heart tones with regular rate and rhythm, S1, S2 normal. No murmurs, gallops, or rubs auscultated. Abdomen:soft, non-tender; normal bowel sounds, no masses, no organomegaly. Extremities: No clubbing or cyanosis. No peripheral edema. Peripheral pulses palpable. Neurologic: Grossly intact. Discharge Medications: There are no discharge medications for this patient. Discharge Instructions: Follow up with PCP in 3-5 days. Take medications as directed. Resume activity as tolerated. Diet: ADULT DIET; Regular     Disposition: Patient will be transferred to inpatient psychiatry. Time spent on discharge 40 minutes.     Signed:  Ashleigh Blanton DO

## 2021-10-30 NOTE — GROUP NOTE
Group Therapy Note    Date: 10/30/2021    Group Start Time: 1600  Group End Time: 1700  Group Topic: Art Therapy     [facility]    Mindy Flores RN           Patient's Goal:  Relaxation      Status After Intervention:  Improved    Participation Level: Active Listener and Interactive    Participation Quality: Appropriate and Attentive      Speech:  normal      Thought Process/Content: Logical      Affective Functioning: Congruent      Level of consciousness:  Alert, Oriented x4 and Attentive      Response to Learning: Able to verbalize current knowledge/experience    Modes of Intervention: Socialization and Exploration      Discipline Responsible: Registered Nurse      Signature:   Mindy Flores RN

## 2021-10-31 PROBLEM — F33.2 MAJOR DEPRESSIVE DISORDER, RECURRENT SEVERE WITHOUT PSYCHOTIC FEATURES (HCC): Status: ACTIVE | Noted: 2021-10-31

## 2021-10-31 LAB — URINE CULTURE, ROUTINE: NORMAL

## 2021-10-31 PROCEDURE — 6370000000 HC RX 637 (ALT 250 FOR IP): Performed by: PSYCHIATRY & NEUROLOGY

## 2021-10-31 PROCEDURE — 1240000000 HC EMOTIONAL WELLNESS R&B

## 2021-10-31 PROCEDURE — 99222 1ST HOSP IP/OBS MODERATE 55: CPT | Performed by: PSYCHIATRY & NEUROLOGY

## 2021-10-31 RX ORDER — HYDROXYZINE HYDROCHLORIDE 25 MG/1
25 TABLET, FILM COATED ORAL 3 TIMES DAILY PRN
Status: DISCONTINUED | OUTPATIENT
Start: 2021-10-31 | End: 2021-11-01 | Stop reason: HOSPADM

## 2021-10-31 RX ORDER — PRAZOSIN HYDROCHLORIDE 2 MG/1
2 CAPSULE ORAL NIGHTLY
Status: DISCONTINUED | OUTPATIENT
Start: 2021-10-31 | End: 2021-11-01 | Stop reason: HOSPADM

## 2021-10-31 RX ORDER — LANOLIN ALCOHOL/MO/W.PET/CERES
3 CREAM (GRAM) TOPICAL NIGHTLY
Status: DISCONTINUED | OUTPATIENT
Start: 2021-10-31 | End: 2021-11-01 | Stop reason: HOSPADM

## 2021-10-31 RX ADMIN — HYDROXYZINE HYDROCHLORIDE 25 MG: 25 TABLET, FILM COATED ORAL at 14:08

## 2021-10-31 RX ADMIN — TRAZODONE HYDROCHLORIDE 50 MG: 50 TABLET ORAL at 21:43

## 2021-10-31 RX ADMIN — SERTRALINE HYDROCHLORIDE 50 MG: 50 TABLET ORAL at 13:43

## 2021-10-31 RX ADMIN — Medication 3 MG: at 21:43

## 2021-10-31 RX ADMIN — PRAZOSIN HYDROCHLORIDE 2 MG: 2 CAPSULE ORAL at 21:43
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
## 2021-10-31 NOTE — PROGRESS NOTES
[facility] Daily Shift Assessment  Nursing Progress Note    Room: 02/602-01 Name: Ralph Nichole Age: 19 y.o. Ethnicity:  Gender: female   Dx: <principal problem not specified>  Precautions: suicide risk  CPAP: No Accu-Chek: No  MSE:  Status and Exam  Normal: No  Facial Expression: Elevated  Affect: Congruent  Level of Consciousness: Alert  Mood:Normal: No  Mood: Depressed, Anxious  Motor Activity:Normal: Yes  Motor Activity: Decreased  Interview Behavior: Cooperative  Preception: [place] to Person, Rosilyn Net to Time, [place] to Place, [place] to Situation  Attention:Normal: Yes  Thought Processes: Circumstantial  Thought Content:Normal: No  Thought Content: Paranoia  Hallucinations: None  Delusions: No  Memory:Normal: Yes  Insight and Judgment: No  Insight and Judgment: Poor Judgment, Poor Insight  Present Suicidal Ideation: No  Present Homicidal Ideation: No  Sleep: Yes, Fair, has restless sleep Hours Slept: 6 Sched Sleep Meds: Yes PRN Sleep Meds: Yes Other PRN Meds: No Med Compliant: Yes Appetite: decreased Percent Meals: 50% Social: Yes ADLs: Yes Speech: normal Depression: 5 Anxiety: 5      Pt reports restless sleep, did encourage patient to be more social and to stay out of bed as much as possible today. She was agreeable to that. She is med compliant and attends groups. she is somewhat social with staff and peers,  She denies SI, HI and AVH.      Colonel Zbigniew RN

## 2021-10-31 NOTE — PROGRESS NOTES
Requirement Note     SW met with pt to complete Psychosocial and CSSR-S on this date. Patients long and short term goals discussed. Patient voiced understanding. Treatment plan sheet signed. Patient verbalized understanding of the treatment plan. Patient participated in goals and objectives of the treatment plan. Patient completed safety plan with , patient received copy of plan, and original was placed into patient's chart. In the last 6 months has the pt been a danger to self: YES  In the last 6 months has the pt been a danger to others: NO  Legal Guardian/POA: NO     Provided patient with [employer] Online handout entitled \"Quitting Smoking. \"  Reviewed handout with patient: addressing dangers of smoking, developing coping skills, and providing basic information about quitting. Patient received all components practical counseling of tobacco practical counseling during the hospital stay.

## 2021-10-31 NOTE — PROGRESS NOTES
Admission Note      Reason for admission/Target Symptom: Patient admitted to [facility] after she took an unknown amount of xanax, along with alcohol. States she took the xanax because she doesn't want to be here anymore. Pt cannot provide much additional history due to intoxication. Multiple previous issues, with depression, self harm, suicidal ideations and suicidal attempts. She was increasingly depressed and dysphoric over her whole \"life\" and did not feel that life was worth being a part of and decided to drink to inebriation and then take excess and all of the xanax tablets she had. She was then concerned and thought she probably needed to get help and evaluation and came to ED for this. Diagnoses: Intentional Drug Overdose, initial encounter; Depression with Suicidal Ideation; Acute Alcohol Intoxication, with delirium     UDS: Negative   BAL: 156 @4:00am on 10/29/21    SW met with treatment team to discuss patient's treatment including care planning, discharge planning, and follow-up needs. Pt has been admitted to [facility]. Treatment team has identified patient's discharge needs as medication management and outpatient therapy/counseling. Pt confirmed  the need for ongoing treatment post inpatient stay. Pt was also provided a handout of contact information for drug and alcohol treatment centers and other community support service such as [facility], AA, and [facility].

## 2021-10-31 NOTE — PROGRESS NOTES
Group Therapy Note    Start Time: 969  End Time:  830  Number of Participants: 11    Type of Group: Community Meeting       Patient's Goal:  \"Going home\"      Notes:        Participation Level:  Active Listener       Participation Quality: Appropriate      Thought Process/Content: Logical      Affective Functioning: Congruent      Mood: Calm      Level of consciousness:  Alert      Modes of Intervention: Support      Discipline Responsible: Behavioral Health Tech II      Signature:  Mikey Saldaña

## 2021-10-31 NOTE — PROGRESS NOTES
[facility] Daily Shift Assessment-Geriatric Unit  Nursing Progress Note          Room: [facility]/602-01   Name: Te Nicole   Age: 19 y.o. Gender: female   Dx: <principal problem not specified>  Precautions: suicide risk  Inpatient Status: involuntary     SLEEP:    Sleep: Yes,   Sleep Quality Good   Hours Slept: 8   Sleep Medications: Yes  PRN Sleep Meds: No       MEDICAL:      Other PRN Meds: Yes   Med Compliant: Yes   Accu-Chek: No   Oxygen/CPAP/BiPAP: No  CIWA/CINA: No   PAIN Assessment: none  Side Effects from medication: No    Is Patient experiencing any respiratory symptoms (headache, fever, body aches, cough. Shweta Crape ): no  Patient educated by nursing to practice social distancing, wear masks, wash hands frequently: yes      Metabolic Screening:    No results found for: LABA1C    No results found for: CHOL  No results found for: TRIG  No results found for: HDL  No components found for: LDLCAL  No results found for: LABVLDL      There is no height or weight on file to calculate BMI. BP Readings from Last 2 Encounters:   10/30/21 (!) 129/90   10/30/21 (!) 131/95         PSYCH:     SI denies suicidal ideation    HI Negative for homicidal ideation        AVH:Absent      Depression: 3 Anxiety: 7       GENERAL:      Appetite: improved  Social: Yes Speech: normal   Appearance:appropriately dressed, appropriately groomed, good hygiene and healthy looking  Assistive Devices: noneLevel of Assist: Independent      GROUP:    Group Participation: Yes  Participation LevelActive Listener    Participation QualityAppropriate    Notes:   Pt is in the day area during this assessment . She is observed socializing ,playing cards and watching TV with peers. She inquired about when she gets to go home. We discussed her reasons for being admitted and what she could expect while on the unit. Pt was medication compliant ,appropriately groomed,and pleasant. Will continue to monitor for safety.

## 2021-10-31 NOTE — PROGRESS NOTES
Behavioral Services  Medicare Certification Upon Admission    I certify that this patient's inpatient psychiatric hospital admission is medically necessary for:    [x] (1) Treatment which could reasonably be expected to improve this patient's condition,       [] (2) Or for diagnostic study;     AND     [x](2) The inpatient psychiatric services are provided while the individual is under the care of a physician and are included in the individualized plan of care.     Estimated length of stay/service 3-5 days    Plan for post-hospital care TBA    Electronically signed by Nicole Sauceda MD on 10/31/2021 at 10:44 AM

## 2021-10-31 NOTE — PROGRESS NOTES
Group Note  Group Time 1947 to 2015  Number of Participants in Group: 10  Number of Patients on Unit:12      Patient attended group:Yes  Reason for Absence:  Intervention for patient absence:        Type of Group:   Wrap-Up/Relaxation    Patient's Goal: See wrap up group sheet    Participation Level: Active Listener         Patient Response to Learning: Positive    Patient's Behavior: Cooperative    Is Patient Social/Interacting: Yes    Relaxation:   Television:Yes   Reading:No   Game/Puzzle:Yes   Phone:  Yes             Please see patient's wrap up group sheet for patient's comments

## 2021-10-31 NOTE — PLAN OF CARE
Problem: Suicide risk  Description: Suicide risk  Goal: Provide patient with safe environment  Description: Provide patient with safe environment  10/31/2021 0443 by Philippe Rollins, RN  Outcome: Met This Shift     Problem: Altered Mood, Depressive Behavior:  Goal: Absence of self-harm  Description: Absence of self-harm  10/31/2021 0443 by Philippe Rollins, RN  Outcome: Met This Shift

## 2021-10-31 NOTE — PLAN OF CARE
Problem: Physical Regulation:  Goal: Will remain free from infection  Description: Will remain free from infection  Outcome: Ongoing     Problem: Skin Integrity:  Goal: Demonstration of wound healing without infection will improve  Description: Demonstration of wound healing without infection will improve  Outcome: Ongoing     Problem: Suicide risk  Goal: Provide patient with safe environment  Description: Provide patient with safe environment  Outcome: Met This Shift     Problem: Coping:  Goal: Absence of complicated grief  Description: Absence of complicated grief  Outcome: Ongoing  Goal: Level of anxiety will decrease  Description: Level of anxiety will decrease  Outcome: Ongoing  Goal: Ability to identify and utilize appropriate coping strategies will improve  Description: Ability to identify and utilize appropriate coping strategies will improve  Outcome: Ongoing  Goal: Ability to identify and utilize available resources and services will improve  Description: Ability to identify and utilize available resources and services will improve  Outcome: Ongoing  Goal: Ability to verbalize feelings will improve  Description: Ability to verbalize feelings will improve  Outcome: Ongoing     Problem: Altered Mood, Depressive Behavior:  Goal: Able to verbalize acceptance of life and situations over which he or she has no control  Description: Able to verbalize acceptance of life and situations over which he or she has no control  Outcome: Ongoing  Goal: Able to verbalize and/or display a decrease in depressive symptoms  Description: Able to verbalize and/or display a decrease in depressive symptoms  Outcome: Ongoing  Goal: Ability to disclose and discuss suicidal ideas will improve  Description: Ability to disclose and discuss suicidal ideas will improve  Outcome: Ongoing  Goal: Able to verbalize support systems  Description: Able to verbalize support systems  Outcome: Ongoing  Goal: Absence of self-harm  Description: Absence of self-harm  Outcome: Met This Shift  Goal: Patient specific goal  Description: Patient specific goal  Outcome: Ongoing  Goal: Participates in care planning  Description: Participates in care planning  Outcome: Ongoing     Problem: Substance Abuse:  Goal: Absence of drug withdrawal signs and symptoms  Description: Absence of drug withdrawal signs and symptoms  Outcome: Ongoing  Goal: Participates in care planning  Description: Participates in care planning  Outcome: Ongoing  Goal: Patient specific goal  Description: Patient specific goal  Outcome: Ongoing     Problem: Tobacco Use:  Goal: Inpatient tobacco use cessation counseling participation  Description: Inpatient tobacco use cessation counseling participation  Outcome: Ongoing

## 2021-10-31 NOTE — PROGRESS NOTES
BHI Daily Shift Assessment  Nursing Progress Note    Room: [facility]/602-01 Name: Carol Whittaker Age: 19 y.o. Ethnicity: -American Gender: female   Dx: <principal problem not specified>  Precautions: suicide risk  CPAP: No Accu-Chek: No  MSE:  Status and Exam  Normal: No  Facial Expression: Elevated  Affect: Congruent  Level of Consciousness: Alert  Mood:Normal: No  Mood: Depressed, Anxious  Motor Activity:Normal: Yes  Motor Activity: Decreased  Interview Behavior: Cooperative  Preception: [place] to Person, Carol Damian to Time, [place] to Place, [place] to Situation  Attention:Normal: Yes  Thought Processes: Circumstantial  Thought Content:Normal: No  Thought Content: Paranoia  Hallucinations: None  Delusions: No  Memory:Normal: Yes  Insight and Judgment: No  Insight and Judgment: Poor Judgment, Poor Insight  Present Suicidal Ideation: No  Present Homicidal Ideation: No  Sleep: Yes, Good, has restless sleep Hours Slept: 7 Sched Sleep Meds: Yes PRN Sleep Meds: No Other PRN Meds: No Med Compliant: Yes Appetite: good Percent Meals: 75% Social: Yes ADLs: Yes Speech: normal Depression: 1 Anxiety: 1    Pt reports fair sleep last night. She has a good appetite. She attends groups and is medication compliant. She is social with peers.   ROSETTE Egan SI and REECE Dye RN

## 2021-11-01 VITALS
HEART RATE: 100 BPM | DIASTOLIC BLOOD PRESSURE: 80 MMHG | SYSTOLIC BLOOD PRESSURE: 129 MMHG | TEMPERATURE: 97.5 F | RESPIRATION RATE: 18 BRPM | OXYGEN SATURATION: 94 %

## 2021-11-01 PROBLEM — F10.10 ALCOHOL ABUSE: Chronic | Status: ACTIVE | Noted: 2021-11-01

## 2021-11-01 PROBLEM — F43.12 CHRONIC POST-TRAUMATIC STRESS DISORDER (PTSD): Chronic | Status: ACTIVE | Noted: 2021-11-01

## 2021-11-01 PROBLEM — T14.91XA SUICIDE ATTEMPT (HCC): Status: RESOLVED | Noted: 2021-10-30 | Resolved: 2021-11-01

## 2021-11-01 PROBLEM — R45.851 SUICIDAL IDEATIONS: Status: RESOLVED | Noted: 2021-10-29 | Resolved: 2021-11-01

## 2021-11-01 PROBLEM — F13.10 SEDATIVE ABUSE (HCC): Chronic | Status: ACTIVE | Noted: 2021-11-01

## 2021-11-01 PROBLEM — F17.200 TOBACCO USE DISORDER: Status: ACTIVE | Noted: 2021-10-29

## 2021-11-01 LAB
ANION GAP SERPL CALCULATED.3IONS-SCNC: 7 MMOL/L (ref 7–19)
BUN BLDV-MCNC: 10 MG/DL (ref 6–20)
CALCIUM SERPL-MCNC: 9.6 MG/DL (ref 8.6–10)
CHLORIDE BLD-SCNC: 102 MMOL/L (ref 98–111)
CO2: 28 MMOL/L (ref 22–29)
CREAT SERPL-MCNC: 0.7 MG/DL (ref 0.5–0.9)
GFR AFRICAN AMERICAN: >59
GFR NON-AFRICAN AMERICAN: >60
GLUCOSE BLD-MCNC: 99 MG/DL (ref 74–109)
HBA1C MFR BLD: 5.3 % (ref 4–6)
POTASSIUM SERPL-SCNC: 4.8 MMOL/L (ref 3.5–5)
SODIUM BLD-SCNC: 137 MMOL/L (ref 136–145)
TSH REFLEX FT4: 0.89 UIU/ML (ref 0.35–5.5)
VITAMIN B-12: 323 PG/ML (ref 211–946)
VITAMIN D 25-HYDROXY: 10.6 NG/ML

## 2021-11-01 PROCEDURE — 82306 VITAMIN D 25 HYDROXY: CPT

## 2021-11-01 PROCEDURE — 83036 HEMOGLOBIN GLYCOSYLATED A1C: CPT

## 2021-11-01 PROCEDURE — 6370000000 HC RX 637 (ALT 250 FOR IP): Performed by: PSYCHIATRY & NEUROLOGY

## 2021-11-01 PROCEDURE — 80048 BASIC METABOLIC PNL TOTAL CA: CPT

## 2021-11-01 PROCEDURE — 5130000000 HC BRIDGE APPOINTMENT

## 2021-11-01 PROCEDURE — 6370000000 HC RX 637 (ALT 250 FOR IP): Performed by: FAMILY MEDICINE

## 2021-11-01 PROCEDURE — 82607 VITAMIN B-12: CPT

## 2021-11-01 PROCEDURE — 84443 ASSAY THYROID STIM HORMONE: CPT

## 2021-11-01 PROCEDURE — 99239 HOSP IP/OBS DSCHRG MGMT >30: CPT | Performed by: PSYCHIATRY & NEUROLOGY

## 2021-11-01 RX ORDER — CHOLECALCIFEROL (VITAMIN D3) 125 MCG
500 CAPSULE ORAL DAILY
Status: DISCONTINUED | OUTPATIENT
Start: 2021-11-01 | End: 2021-11-01 | Stop reason: HOSPADM

## 2021-11-01 RX ORDER — ERGOCALCIFEROL 1.25 MG/1
50000 CAPSULE ORAL WEEKLY
Qty: 11 CAPSULE | Refills: 1 | Status: SHIPPED | OUTPATIENT
Start: 2021-11-08 | End: 2022-01-18

## 2021-11-01 RX ORDER — LANOLIN ALCOHOL/MO/W.PET/CERES
3 CREAM (GRAM) TOPICAL NIGHTLY
Qty: 30 TABLET | Refills: 1 | Status: SHIPPED | OUTPATIENT
Start: 2021-11-01 | End: 2021-12-01

## 2021-11-01 RX ORDER — PRAZOSIN HYDROCHLORIDE 2 MG/1
2 CAPSULE ORAL NIGHTLY
Qty: 30 CAPSULE | Refills: 1 | Status: SHIPPED | OUTPATIENT
Start: 2021-11-01 | End: 2021-12-01

## 2021-11-01 RX ORDER — TRAZODONE HYDROCHLORIDE 50 MG/1
50 TABLET ORAL NIGHTLY
Qty: 30 TABLET | Refills: 1 | Status: SHIPPED | OUTPATIENT
Start: 2021-11-01 | End: 2021-12-01

## 2021-11-01 RX ORDER — ERGOCALCIFEROL 1.25 MG/1
50000 CAPSULE ORAL WEEKLY
Status: DISCONTINUED | OUTPATIENT
Start: 2021-11-01 | End: 2021-11-01 | Stop reason: HOSPADM

## 2021-11-01 RX ADMIN — ERGOCALCIFEROL 50000 UNITS: 1.25 CAPSULE ORAL at 09:47

## 2021-11-01 RX ADMIN — SERTRALINE HYDROCHLORIDE 50 MG: 50 TABLET ORAL at 08:18

## 2021-11-01 RX ADMIN — ACETAMINOPHEN 650 MG: 325 TABLET ORAL at 09:47

## 2021-11-01 RX ADMIN — CYANOCOBALAMIN TAB 500 MCG 500 MCG: 500 TAB at 09:47

## 2021-11-01 ASSESSMENT — PAIN SCALES - GENERAL: PAINLEVEL_OUTOF10: 6

## 2021-11-01 NOTE — PLAN OF CARE
Behavior:  Goal: Able to verbalize acceptance of life and situations over which he or she has no control  11/1/2021 1005 by Fawn Snider RN  Outcome: Completed  11/1/2021 0541 by So Warren RN  Outcome: Ongoing  11/1/2021 0526 by So Warren RN  Outcome: Ongoing  Goal: Able to verbalize and/or display a decrease in depressive symptoms  11/1/2021 1005 by Fawn Snider RN  Outcome: Completed  11/1/2021 0541 by So Warren RN  Outcome: Ongoing  11/1/2021 0526 by So Warren RN  Outcome: Ongoing  Goal: Ability to disclose and discuss suicidal ideas will improve  11/1/2021 1005 by Fawn Snider RN  Outcome: Completed  11/1/2021 0541 by So Warren RN  Outcome: Ongoing  11/1/2021 0526 by So Warren RN  Outcome: Ongoing  Goal: Able to verbalize support systems  11/1/2021 1005 by Fawn Snider RN  Outcome: Completed  11/1/2021 0541 by So Warren RN  Outcome: Ongoing  11/1/2021 0526 by So Warren RN  Outcome: Ongoing  Goal: Absence of self-harm  11/1/2021 1005 by Fawn Snider RN  Outcome: Completed  11/1/2021 0541 by So Warren RN  Outcome: Met This Shift  11/1/2021 0526 by So Warren RN  Outcome: Ongoing  Goal: Patient specific goal  11/1/2021 1005 by Fawn Snider RN  Outcome: Completed  11/1/2021 0541 by So Warren RN  Outcome: Ongoing  11/1/2021 0526 by So Warren RN  Outcome: Ongoing  Goal: Participates in care planning  11/1/2021 1005 by Fawn Snider RN  Outcome: Completed  11/1/2021 0541 by So Warren RN  Outcome: Ongoing  11/1/2021 0526 by So Warren RN  Outcome: Ongoing     Problem: Substance Abuse:  Goal: Absence of drug withdrawal signs and symptoms  11/1/2021 1005 by Fawn Snider RN  Outcome: Completed  11/1/2021 0541 by So Warren RN  Outcome: Ongoing  11/1/2021 0526 by So Warren RN  Outcome: Ongoing  Goal: Participates in care planning  11/1/2021 1005 by Fawn Snider RN  Outcome: Completed  11/1/2021 0541 by So Warren, RN  Outcome: Ongoing  11/1/2021 0526 by Joe Prescott RN  Outcome: Ongoing  Goal: Patient specific goal  11/1/2021 1005 by Marguarite Habermann, RN  Outcome: Completed  11/1/2021 0541 by Joe Prescott RN  Outcome: Ongoing  11/1/2021 0526 by Joe Prescott RN  Outcome: Ongoing     Problem: Tobacco Use:  Goal: Inpatient tobacco use cessation counseling participation  11/1/2021 1005 by Marguarite Habermann, RN  Outcome: Completed  11/1/2021 0541 by Joe Prescott RN  Outcome: Ongoing  11/1/2021 0526 by Joe Prescott RN  Outcome: Ongoing

## 2021-11-01 NOTE — PROGRESS NOTES
Group Therapy Note    Start Time: 800  End Time:  775  Number of Participants: 14    Type of Group: Community Meeting       Patient's Goal:  \"leaving\"      Notes:      Participation Level:  Active Listener       Participation Quality: Appropriate      Thought Process/Content: Logical      Affective Functioning: Congruent      Mood: calm      Level of consciousness:  Alert      Modes of Intervention: Support      Discipline Responsible: Behavioral Health Tech II      Signature:  Kirt Vo

## 2021-11-01 NOTE — PLAN OF CARE
Problem: Physical Regulation:  Goal: Will remain free from infection  Description: Will remain free from infection  11/1/2021 0541 by Christine Strong RN  Outcome: Ongoing  11/1/2021 0526 by Christine Strong RN  Outcome: Ongoing     Problem: Skin Integrity:  Goal: Demonstration of wound healing without infection will improve  Description: Demonstration of wound healing without infection will improve  11/1/2021 0541 by Christine Strong RN  Outcome: Ongoing  11/1/2021 0526 by Christine Strong RN  Outcome: Ongoing     Problem: Suicide risk  Goal: Provide patient with safe environment  Description: Provide patient with safe environment  11/1/2021 0541 by Christine Strong RN  Outcome: Ongoing  11/1/2021 0526 by Christine Strong RN  Outcome: Met This Shift     Problem: Coping:  Goal: Absence of complicated grief  Description: Absence of complicated grief  79/9/2167 0541 by Christine Strong RN  Outcome: Ongoing  11/1/2021 0526 by Christine Strong RN  Outcome: Ongoing  Goal: Level of anxiety will decrease  Description: Level of anxiety will decrease  11/1/2021 0541 by Christine Strong RN  Outcome: Ongoing  11/1/2021 0526 by Christine Strong RN  Outcome: Ongoing  Goal: Ability to identify and utilize appropriate coping strategies will improve  Description: Ability to identify and utilize appropriate coping strategies will improve  11/1/2021 0541 by Christine Strong RN  Outcome: Ongoing  11/1/2021 0526 by Christine Strong RN  Outcome: Ongoing  Goal: Ability to identify and utilize available resources and services will improve  Description: Ability to identify and utilize available resources and services will improve  11/1/2021 0541 by Christine Strong RN  Outcome: Ongoing  11/1/2021 0526 by Christine Strong RN  Outcome: Ongoing  Goal: Ability to verbalize feelings will improve  Description: Ability to verbalize feelings will improve  11/1/2021 0541 by Christine Strong RN  Outcome: Ongoing  11/1/2021 0526 by Christine Strong RN  Outcome: Ongoing Problem: Coping:  Goal: Absence of complicated grief  Description: Absence of complicated grief  55/6/0783 0541 by Trevor Alvarado RN  Outcome: Ongoing  11/1/2021 0526 by Trevor Alvarado RN  Outcome: Ongoing  Goal: Level of anxiety will decrease  Description: Level of anxiety will decrease  11/1/2021 0541 by Trevor Alvarado RN  Outcome: Ongoing  11/1/2021 0526 by Trevor Alvarado RN  Outcome: Ongoing  Goal: Ability to identify and utilize appropriate coping strategies will improve  Description: Ability to identify and utilize appropriate coping strategies will improve  11/1/2021 0541 by Trevor Alvarado RN  Outcome: Ongoing  11/1/2021 0526 by Trevor Alvarado RN  Outcome: Ongoing  Goal: Ability to identify and utilize available resources and services will improve  Description: Ability to identify and utilize available resources and services will improve  11/1/2021 0541 by Trevor Alvarado RN  Outcome: Ongoing  11/1/2021 0526 by Trevor Alvarado RN  Outcome: Ongoing  Goal: Ability to verbalize feelings will improve  Description: Ability to verbalize feelings will improve  11/1/2021 0541 by Trevor Alvarado RN  Outcome: Ongoing  11/1/2021 0526 by Trevor Alvarado RN  Outcome: Ongoing     Problem: Altered Mood, Depressive Behavior:  Goal: Able to verbalize acceptance of life and situations over which he or she has no control  Description: Able to verbalize acceptance of life and situations over which he or she has no control  11/1/2021 0541 by Trevor Alvarado RN  Outcome: Ongoing  11/1/2021 0526 by Trevor Alvarado RN  Outcome: Ongoing  Goal: Able to verbalize and/or display a decrease in depressive symptoms  Description: Able to verbalize and/or display a decrease in depressive symptoms  11/1/2021 0541 by Tervor Alvarado RN  Outcome: Ongoing  11/1/2021 0526 by Trevor Alvarado RN  Outcome: Ongoing  Goal: Ability to disclose and discuss suicidal ideas will improve  Description: Ability to disclose and discuss suicidal ideas will improve  11/1/2021 0541 by Courtney Fernandez RN  Outcome: Ongoing  11/1/2021 0526 by Courtney Fernandez RN  Outcome: Ongoing  Goal: Able to verbalize support systems  Description: Able to verbalize support systems  11/1/2021 0541 by Courtney Fernandez RN  Outcome: Ongoing  11/1/2021 0526 by Courtney Fernandez RN  Outcome: Ongoing  Goal: Absence of self-harm  Description: Absence of self-harm  11/1/2021 0541 by Courtney Fernandez RN  Outcome: Met This Shift  11/1/2021 0526 by Courtney Fernandez RN  Outcome: Ongoing  Goal: Patient specific goal  Description: Patient specific goal  11/1/2021 0541 by Courtney Fernandez RN  Outcome: Ongoing  11/1/2021 0526 by Courtney Fernandez RN  Outcome: Ongoing  Goal: Participates in care planning  Description: Participates in care planning  11/1/2021 0541 by Courtney Fernandez RN  Outcome: Ongoing  11/1/2021 0526 by Courtney Fernandez RN  Outcome: Ongoing     Problem: Substance Abuse:  Goal: Absence of drug withdrawal signs and symptoms  Description: Absence of drug withdrawal signs and symptoms  11/1/2021 0541 by Courtney Fernandez RN  Outcome: Ongoing  11/1/2021 0526 by Courtney Fernandez RN  Outcome: Ongoing  Goal: Participates in care planning  Description: Participates in care planning  11/1/2021 0541 by Courtney Fernandez RN  Outcome: Ongoing  11/1/2021 0526 by Courtney Fernandez RN  Outcome: Ongoing  Goal: Patient specific goal  Description: Patient specific goal  11/1/2021 0541 by Courtney Fernandez RN  Outcome: Ongoing  11/1/2021 0526 by Courtney Fernandez RN  Outcome: Ongoing     Problem: Tobacco Use:  Goal: Inpatient tobacco use cessation counseling participation  Description: Inpatient tobacco use cessation counseling participation  11/1/2021 0541 by Courtney Fernandez RN  Outcome: Ongoing  11/1/2021 0526 by Courtney Fernandez RN  Outcome: Ongoing

## 2021-11-01 NOTE — SUICIDE SAFETY PLAN
SAFETY PLAN    A suicide Safety Plan is a document that supports someone when they are having thoughts of suicide. Warning Signs that indicate a suicidal crisis may be developing: What (situations, thoughts, feelings, body sensations, behaviors, etc.) do you experience that lets you know you are beginning to think about suicide? 1. isolation  2. Not communicating  3. Walking away    Internal Coping Strategies:  What things can I do (relaxation techniques, hobbies, physical activities, etc.) to take my mind off my problems without contacting another person? 1. painting  2. poetry  3. drawing    People and social settings that provide distraction: Who can I call or where can I go to distract me? 1. Name: althea  Phone: [phone]  2. Name: Juan Antonio Chiang     3. Place: [employer]            4. Place: work and school    People whom I can ask for help: Who can I call when I need help - for example, friends, family, clergy, someone else? 1. Name: althea                  2. Name: Neno Zuñiga or Binh BrandictedZecter agencies I can contact during a crisis: Who can I call for help - for example, my doctor, my psychiatrist, my psychologist, a mental health provider, a suicide hotline? 1. Clinician Name: [id number]   Phone: [phone]      Clinician Pager or Emergency Contact #: [phone]    2. Clinician Name: [address]   Phone: [phone]      Clinician Pager or Emergency Contact #: [phone]    3. Suicide Prevention Lifeline: 7-483-274-TALK (1675)    4. Local [facility] Emergency Services : [address] Emergency Department      Emergency Services Address: [place] [address] [address] [facility]  Emergency Services Phone: [phone]    Making the environment safe: How can I make my environment (house/apartment/living space) safer? For example, can I remove guns, medications, and other items? 1. Remove all guns and weapons from the home.   2. Discard any extra medications in the home.

## 2021-11-01 NOTE — DISCHARGE SUMMARY
peers.  Presents with bright affect. Smiling. States she is doing \"much better today. \"  Mood has improved. Slept well with trazodone and prazosin. \"Feeling positive today. I need to restart treatment. I need to take care of myself. I want to be there for kids. \"  States in the past tried Zoloft and would like to take it again. We discussed the importance of trauma focused therapy and grief counseling. Patient was receptive. Hoping to go home tomorrow. States she talked to her mother.  Joce Stands, PTSD  Suicide attempts/gestures: h/o cutting  Prior hospitalizations: [facility]   Medication trials: Zoloft, Wellbutrin, Prazosin, Trazodone, Atarax  Mental health contact: Lost to follow-up   Head trauma: Denies    Hospital Course:  Patient was admitted to the behavioral health floor and was acclimated to the unit. She was placed on suicide precautions. Labs were reviewed and physical exam was completed by Dr. Joseline Courtney and associates. Home medications were reconciled. NORMA was obtained and reviewed. Zoloft was restarted for depression. Patient received trazodone for sleep. Prazosin was given for PTSD symptoms. Patient tolerated all her medications well and was compliant. Patient attended and participated in groups. All interactions with the peers and staff members were appropriate. Behaviorally, she was not a problem. There was no evidence of suicidality. Sleep and appetite improved. With the above-mentioned medications changes as well as psychotherapeutic interventions, patient reported considerable improvement in her condition and requested discharge home. Patient was future oriented and was looking forward to going back home to her children. It was felt that patient was at her baseline. Patient has no access to guns at home. Importance of sobriety was discussed. Patient denied the need for outpatient chemical dependency treatment.     On 11/1/2021 it was therefore decided to discharge the patient, as it was felt that she received maximal benefit from her hospitalization. This patient is not suicidal, homicidal or psychotic at discharge. She does not present danger to self or others.       Number of antipsychotic medication prescribed at discharge: 0  IF MORE THAN ONE IS USED: NA    History of greater than 3 failed multiple monotherapy trials: NA  Monotherapy taper plan/ cross taper in progress: NA  Augmentation of Clozapine: NA    Referral to addiction treatment: patient refused    Prescription for Alcohol or Drug Disorder Medication: patient refused    Prescription for Tobacco Cessation medication: none    If no prescriptions for Tobacco Cessation must document why: patient refused    Consults: Internal medicine    Significant Diagnostic Studies:   Lab Results   Component Value Date    WBC 4.6 (L) 10/30/2021    HGB 12.4 10/30/2021    HCT 38.4 10/30/2021    MCV 93.2 10/30/2021     10/30/2021     Lab Results   Component Value Date     11/01/2021    K 4.8 11/01/2021     11/01/2021    CO2 28 11/01/2021    BUN 10 11/01/2021    CREATININE 0.7 11/01/2021    GLUCOSE 99 11/01/2021    CALCIUM 9.6 11/01/2021    PROT 6.5 (L) 10/30/2021    LABALBU 3.7 10/30/2021    BILITOT 0.4 10/30/2021    ALKPHOS 79 10/30/2021    AST 17 10/30/2021    ALT 21 10/30/2021    LABGLOM >60 11/01/2021    GFRAA >59 11/01/2021         Lab Results   Component Value Date    WRMXGAUX06 323 11/01/2021     Lab Results   Component Value Date    VITD25 10.6 (L) 11/01/2021     No results found for: CHOL  No results found for: TRIG  No results found for: HDL  No results found for: LDLCHOLESTEROL, LDLCALC  No results found for: LABVLDL, VLDL  No results found for: [facility]  Lab Results   Component Value Date    LABA1C 5.3 11/01/2021     No results found for: EAG  Lab Results   Component Value Date    TSHFT4 0.89 11/01/2021       Treatments: RN and SW    Mental status examination at the time of discharge:  Alert, Oriented X 4  Appearance:  Improved Hygiene, smiling  Speech with Regular Rate and Rhythm  Eye Contact:  Good  No Psychomotor Agitation/Retardation Noted  Attitude:  Cooperative  Mood:  \"Much better\"  Affective: Congruent, appropriate to the situation, with a normal range and intensity  Thought Processes:  Coherently communicated, logical and goal oriented  Thought Content:  No Suicidal Ideation, No Homicidal Ideation, No Auditory or Visual Hallucinations, NO Overt Delusions  Insight: Improved  Judgement: Improved  Memory is intact for both remote and recent  Intellectual Functioning:  Within the [address] of Knowledge:  Adequate  Attention and Concentration:  Adequate    Discharge Exam:  Please, see medical note    Disposition: home    Patient Instructions:   Current Discharge Medication List      START taking these medications    Details   sertraline (ZOLOFT) 50 MG tablet Take 1 tablet by mouth daily  Qty: 30 tablet, Refills: 1      traZODone (DESYREL) 50 MG tablet Take 1 tablet by mouth nightly  Qty: 30 tablet, Refills: 1      vitamin B-12 500 MCG tablet Take 1 tablet by mouth daily  Qty: 30 tablet, Refills: 1      vitamin D (ERGOCALCIFEROL) 1.25 MG (79532 UT) CAPS capsule Take 1 capsule by mouth once a week for 11 doses  Qty: 11 capsule, Refills: 1      prazosin (MINIPRESS) 2 MG capsule Take 1 capsule by mouth nightly  Qty: 30 capsule, Refills: 1      melatonin 3 MG TABS tablet Take 1 tablet by mouth nightly  Qty: 30 tablet, Refills: 1             Activity: as tolerated  Diet: regular diet  Wound Care: none needed    Follow-up:   Follow up with PCP in 2 week(s)  on follow up with PCP, please review labs/imaging done during this Hospital stay, and discuss any additional/repeat testing or treatment needed with your PCP     Nov3 Go to [address]  Wednesday Nov 3, 2021  Intake/therapy appt with Ish Coreas, please provide a photo it, soc sec card, insurance card [facility]   [address], [address]   Phone [phone]   Fax [phone]   CRISIS LINE: [phone]     Nov5 Go to [address]  Friday Nov 5, 2021  Medication mangment appt at 8:30am, with [facility], please provide a current list of medications.   [facility]   [address], [address]   Phone [phone]   Fax [phone]   CRISIS LINE: [phone]          Time worked: 34 min    Participation: good    Electronically signed by Black Bird MD on 11/1/2021 at 9:57 AM
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
## 2021-11-01 NOTE — PROGRESS NOTES
[facility]  Discharge Note  Bridge Appointment completed: Reviewed Discharge Instructions with patient. Patient verbalizes understanding and agreement with the discharge plan using the teachback method. Referral for Outpatient Tobacco Cessation Counseling, upon discharge (vin X if applicable and completed):    ( )  Hospital staff assisted patient to call Quit Line or faxed referral                                   during hospitalization                  ( )  Recognizing danger situations (included triggers and roadblocks), if not completed on admission                    ( )  Coping skills (new ways to manage stress, exercise, relaxation techniques, changing routine, distraction), if not completed on admission                                                           ( )  Basic information about quitting (benefits of quitting, techniques in how to quit, available resources, if not completed on admission  ( ) Referral for counseling faxed to [facility]   (x ) Patient refused referral  ( x) Patient refused counseling  ( x) Patient refused smoking cessation medication upon discharge    Vaccinations (vin X if applicable and completed):  ( ) Patient states already received influenza vaccine elsewhere  ( ) Patient received influenza vaccine during this hospitalization  (x ) Patient refused influenza vaccine at this time      Pt discharged with followings belongings:   Dentures: None  Vision - Corrective Lenses: Glasses  Hearing Aid: None  Jewelry:  (nose ring)  Body Piercings Removed: No (can't remove)  Clothing: Undergarments (Comment), Footwear, Pants, Shirt  Were All Patient Medications Collected?: Not Applicable  Other Valuables: Cell phone, Other (Comment) (cards, , lighter, cigarette sticks)   Valuables sent home with patient. Valuables retrieved from safe and returned to patient. Patient left department with mother   via car  , discharged to home  .  Patient education on aftercare instructions: yes  Patient verbalize understanding of AVS:  yes. Suicidal Ideations? No AVH? denies HI?  Negative for homicidal ideation

## 2021-11-01 NOTE — PROGRESS NOTES
Treatment Team Note:    SW met with [place] team to discuss Pts [address] plans. Progress/Behavior/Group Attendance: TBD    Target Symptoms/Reason for admission:  Patient admitted to [facility] after she took an unknown amount of xanax, along with alcohol. States she took the xanax because she doesn't want to be here anymore. Pt cannot provide much additional history due to intoxication. Multiple previous issues, with depression, self harm, suicidal ideations and suicidal attempts.  She was increasingly depressed and dysphoric over her whole \"life\" and did not feel that life was worth being a part of and decided to drink to inebriation and then take excess and all of the xanax tablets she had.  She was then concerned and thought she probably needed to get help and evaluation and came to ED for this.      Diagnoses: Intentional  Major depressive disorder, recurrent, severe, without psychotic features, PTSD, chronic, Alcohol abuse, Sedative abuse, Tobacco use disorder     UDS: Negative   BAL: 156 @4:00am on 10/29/21    AftercarePlan: [address]    Pt lives with: mom    Collateral obtained from: mom  On:11/1/21    Family Session: JUAN    Misc:
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
## 2021-11-01 NOTE — PROGRESS NOTES
[facility] Daily Shift Assessment-Adult Unit  Nursing Progress Note          Room: [facility]-   Name: Alexandru Dang   Age: 19 y.o. Gender: female   Dx: <principal problem not specified>  Precautions: suicide risk  Inpatient Status: involuntary     SLEEP:    Sleep: Yes,   Sleep Quality Fair   Hours Slept: 8   Sleep Medications: Yes Trazodone 50mg melatonin 3mg  PRN Sleep Meds: No       MEDICAL:      Other PRN Meds: No   Med Compliant: Yes   Accu-Chek: No   Oxygen/CPAP/BiPAP: No  CIWA/CINA: No   PAIN Assessment: none  Side Effects from medication: No    Is Patient experiencing any respiratory symptoms (headache, fever, body aches, cough. Pattie Beyer ): no  Patient educated by nursing to practice social distancing, wear masks, wash hands frequently: yes      Metabolic Screening:    No results found for: LABA1C    No results found for: CHOL  No results found for: TRIG  No results found for: HDL  No components found for: LDLCAL  No results found for: LABVLDL      There is no height or weight on file to calculate BMI. BP Readings from Last 2 Encounters:   10/31/21 (!) 139/92   10/30/21 (!) 131/95         PSYCH:     SI denies suicidal ideation    HI Negative for homicidal ideation        AVH:Absent      Depression: 0 Anxiety: 4       GENERAL:      Appetite: no change from normal  Social: Yes Speech: normal   Appearance:appropriately dressed, appropriately groomed, good hygiene and healthy looking  Assistive Devices: noneLevel of Assist: Independent      GROUP:    Group Participation: Yes  Participation LevelActive Listener    Participation QualityAppropriate    Notes: Pt is in the day area during this interview. She is pleasant and cooperative. Observed playing cards watching TV and socializing with peers. She says she feels much better,wants to go home.

## 2021-11-01 NOTE — PROGRESS NOTES
Collateral obtained from: patient harika Barboza [phone]    Immediate Stressors & Time Episode Began: Patient lost her sister back in June and patient has been dealing with depression and patient has been spiraling out of control. Patient hasnt been able to see a therapist.  Patient doesn't want to take medication. Diagnosis/Hx of compliance with meds: Not taking medication    Tx Hx/Past hospitalizations:  No previous admissions. Family hx of psychiatric issues: Patients father has been diagnosed with Bipolar disorder     Substance Abuse: No issues    Pending Legal: No issues    Safety Issues (Weapons? Hx of attempts): No issues    Support system/Medication Managed by: The importance of medication management and locking extra medication in a secured location was explained and reccommended to collateral.     Additional Info: Patient lives with her mom and her children.

## 2021-11-02 NOTE — PROGRESS NOTES
Discharge Note     Pt discharging on this date. Pt denies SI, HI, and AVH at this time. Pt reports improvement in behavior and is leaving unit in overall good condition. SW and pt discussed pt's follow up appointments and importance of attending appointments as scheduled, pt voiced understanding and agreement. Pt and SW also discussed pt safety plan and pt able to verbally identify: warning signs, coping strategies, places and people that help make the pt feel better/distract negative thoughts, friends/family/agencies/professionals the pt can reach out to in a crisis, and something that is important to the pt/worth living for. Pt provided the national suicide prevention hotline number (3-907-538-782-980-3692) as well as local community behavioral health [facility]) crisis number for emergencies (2-480-816-772-016-1888). Pt to follow up with:  [address] ([address]) on _11_/_03_/21 @ 12:00pm. Patient will follow up with bart Le at [facility] for medication management, patient will be seen on 11__/05__/21 @ 8:30am for the med management appt.      Referral to out patient tobacco cessation counseling treatment:    Patient refused referral to outpatient tobacco cessation counseling    SW offered to assist pt with transportation, pt reports that she has transportation home

## 2021-11-02 NOTE — PROGRESS NOTES
Progress Note  Terrence Schneider  11/1/2021 10:34 PM  Subjective:   Admit Date:   10/30/2021      CC/ADMIT DX:       Interval History:   Reviewed overnight events and nursing notes. She has no new medical issues. I have reviewed all labs/diagnostics from the last 24hrs. ROS:   I have done a 10 point ROS and all are negative, except what is mentioned in the HPI. No diet orders on file    Medications:             Objective:   Vitals: /80   Pulse 100   Temp 97.5 °F (36.4 °C)   Resp 18   LMP  (LMP Unknown)   SpO2 94%  No intake or output data in the 24 hours ending 11/01/21 2231  General appearance: alert and cooperative with exam  Extremities: extremities normal, atraumatic, no cyanosis or edema  Neurologic:  No obvious focal neurologic deficits. Skin: no rashes    Assessment and Plan:   Principal Problem:    Major depressive disorder, recurrent severe without psychotic features ([address]  Active Problems:    Tobacco use disorder    Chronic post-traumatic stress disorder (PTSD)    Alcohol abuse    Sedative abuse ([facility]  Resolved Problems:    Suicide attempt ([facility]    Vit D Def    Plan:  1. Continue present medication(s)   2. Replace Vit D  3. Follow with Psych      Discharge planning:   her home     Reviewed treatment plans with the patient and/or family.              Electronically signed by Julia Marte MD on 11/1/2021 at 10:34 PM

## 2022-04-09 ENCOUNTER — HOSPITAL ENCOUNTER (EMERGENCY)
Age: 22
Discharge: HOME | End: 2022-04-09
Attending: EMERGENCY MEDICINE
Payer: COMMERCIAL

## 2022-04-09 VITALS
BODY MASS INDEX: 38.41 KG/M2 | SYSTOLIC BLOOD PRESSURE: 118 MMHG | DIASTOLIC BLOOD PRESSURE: 81 MMHG | OXYGEN SATURATION: 96 % | HEIGHT: 64 IN | RESPIRATION RATE: 16 BRPM | TEMPERATURE: 98.1 F | WEIGHT: 225 LBS | HEART RATE: 84 BPM

## 2022-04-09 DIAGNOSIS — Z91.018 FOOD ALLERGY: Primary | ICD-10-CM

## 2022-04-09 PROCEDURE — 99285 EMERGENCY DEPT VISIT HI MDM: CPT

## 2022-04-09 PROCEDURE — 6370000000 HC RX 637 (ALT 250 FOR IP): Performed by: EMERGENCY MEDICINE

## 2022-04-09 RX ORDER — DIPHENHYDRAMINE HCL 25 MG
50 TABLET ORAL ONCE
Status: COMPLETED | OUTPATIENT
Start: 2022-04-09 | End: 2022-04-09

## 2022-04-09 RX ORDER — METHYLPREDNISOLONE 4 MG/1
TABLET ORAL
Qty: 1 KIT | Refills: 0 | Status: SHIPPED | OUTPATIENT
Start: 2022-04-09

## 2022-04-09 RX ORDER — PREDNISONE 20 MG/1
20 TABLET ORAL ONCE
Status: COMPLETED | OUTPATIENT
Start: 2022-04-09 | End: 2022-04-09

## 2022-04-09 RX ORDER — EPINEPHRINE 1 MG/ML
0.3 INJECTION INTRAMUSCULAR; INTRAVENOUS; SUBCUTANEOUS ONCE
Qty: 0.3 ML | Refills: 0 | Status: SHIPPED | OUTPATIENT
Start: 2022-04-09 | End: 2022-04-09

## 2022-04-09 RX ADMIN — DIPHENHYDRAMINE HYDROCHLORIDE 50 MG: 25 TABLET ORAL at 01:49

## 2022-04-09 RX ADMIN — PREDNISONE 20 MG: 20 TABLET ORAL at 01:49

## 2022-04-09 ASSESSMENT — ENCOUNTER SYMPTOMS
VOICE CHANGE: 0
SHORTNESS OF BREATH: 0
TROUBLE SWALLOWING: 0
VOMITING: 0
DIARRHEA: 0
WHEEZING: 0
NAUSEA: 0
STRIDOR: 0
ABDOMINAL PAIN: 0
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
